# Patient Record
Sex: FEMALE | Race: WHITE | NOT HISPANIC OR LATINO | URBAN - METROPOLITAN AREA
[De-identification: names, ages, dates, MRNs, and addresses within clinical notes are randomized per-mention and may not be internally consistent; named-entity substitution may affect disease eponyms.]

---

## 2024-04-03 ENCOUNTER — EMERGENCY (EMERGENCY)
Facility: HOSPITAL | Age: 49
LOS: 1 days | Discharge: HOME | End: 2024-04-03
Attending: EMERGENCY MEDICINE | Admitting: EMERGENCY MEDICINE
Payer: COMMERCIAL

## 2024-04-03 VITALS
HEART RATE: 90 BPM | SYSTOLIC BLOOD PRESSURE: 129 MMHG | OXYGEN SATURATION: 97 % | RESPIRATION RATE: 18 BRPM | TEMPERATURE: 98 F | DIASTOLIC BLOOD PRESSURE: 85 MMHG

## 2024-04-03 VITALS
SYSTOLIC BLOOD PRESSURE: 113 MMHG | OXYGEN SATURATION: 96 % | HEART RATE: 71 BPM | DIASTOLIC BLOOD PRESSURE: 79 MMHG | RESPIRATION RATE: 17 BRPM | TEMPERATURE: 98 F

## 2024-04-03 DIAGNOSIS — R10.9 UNSPECIFIED ABDOMINAL PAIN: ICD-10-CM

## 2024-04-03 DIAGNOSIS — Z88.2 ALLERGY STATUS TO SULFONAMIDES: ICD-10-CM

## 2024-04-03 DIAGNOSIS — R31.9 HEMATURIA, UNSPECIFIED: ICD-10-CM

## 2024-04-03 DIAGNOSIS — R30.0 DYSURIA: ICD-10-CM

## 2024-04-03 DIAGNOSIS — Z87.442 PERSONAL HISTORY OF URINARY CALCULI: ICD-10-CM

## 2024-04-03 DIAGNOSIS — Z87.19 PERSONAL HISTORY OF OTHER DISEASES OF THE DIGESTIVE SYSTEM: ICD-10-CM

## 2024-04-03 DIAGNOSIS — R82.998 OTHER ABNORMAL FINDINGS IN URINE: ICD-10-CM

## 2024-04-03 DIAGNOSIS — Z88.6 ALLERGY STATUS TO ANALGESIC AGENT: ICD-10-CM

## 2024-04-03 DIAGNOSIS — R19.7 DIARRHEA, UNSPECIFIED: ICD-10-CM

## 2024-04-03 DIAGNOSIS — Z88.0 ALLERGY STATUS TO PENICILLIN: ICD-10-CM

## 2024-04-03 DIAGNOSIS — R11.0 NAUSEA: ICD-10-CM

## 2024-04-03 DIAGNOSIS — N28.89 OTHER SPECIFIED DISORDERS OF KIDNEY AND URETER: ICD-10-CM

## 2024-04-03 LAB
ANION GAP SERPL CALC-SCNC: 12 MMOL/L — SIGNIFICANT CHANGE UP (ref 5–17)
BASOPHILS # BLD AUTO: 0.04 K/UL — SIGNIFICANT CHANGE UP (ref 0–0.2)
BASOPHILS NFR BLD AUTO: 0.9 % — SIGNIFICANT CHANGE UP (ref 0–2)
BUN SERPL-MCNC: 14 MG/DL — SIGNIFICANT CHANGE UP (ref 7–23)
CALCIUM SERPL-MCNC: 10.2 MG/DL — SIGNIFICANT CHANGE UP (ref 8.4–10.5)
CHLORIDE SERPL-SCNC: 105 MMOL/L — SIGNIFICANT CHANGE UP (ref 96–108)
CO2 SERPL-SCNC: 25 MMOL/L — SIGNIFICANT CHANGE UP (ref 22–31)
CREAT SERPL-MCNC: 0.83 MG/DL — SIGNIFICANT CHANGE UP (ref 0.5–1.3)
EGFR: 86 ML/MIN/1.73M2 — SIGNIFICANT CHANGE UP
EOSINOPHIL # BLD AUTO: 0.05 K/UL — SIGNIFICANT CHANGE UP (ref 0–0.5)
EOSINOPHIL NFR BLD AUTO: 1.2 % — SIGNIFICANT CHANGE UP (ref 0–6)
GLUCOSE SERPL-MCNC: 115 MG/DL — HIGH (ref 70–99)
HCT VFR BLD CALC: 35 % — SIGNIFICANT CHANGE UP (ref 34.5–45)
HGB BLD-MCNC: 11.4 G/DL — LOW (ref 11.5–15.5)
IMM GRANULOCYTES NFR BLD AUTO: 0.2 % — SIGNIFICANT CHANGE UP (ref 0–0.9)
LYMPHOCYTES # BLD AUTO: 0.69 K/UL — LOW (ref 1–3.3)
LYMPHOCYTES # BLD AUTO: 16.1 % — SIGNIFICANT CHANGE UP (ref 13–44)
MCHC RBC-ENTMCNC: 28.6 PG — SIGNIFICANT CHANGE UP (ref 27–34)
MCHC RBC-ENTMCNC: 32.6 GM/DL — SIGNIFICANT CHANGE UP (ref 32–36)
MCV RBC AUTO: 87.7 FL — SIGNIFICANT CHANGE UP (ref 80–100)
MONOCYTES # BLD AUTO: 0.34 K/UL — SIGNIFICANT CHANGE UP (ref 0–0.9)
MONOCYTES NFR BLD AUTO: 7.9 % — SIGNIFICANT CHANGE UP (ref 2–14)
NEUTROPHILS # BLD AUTO: 3.15 K/UL — SIGNIFICANT CHANGE UP (ref 1.8–7.4)
NEUTROPHILS NFR BLD AUTO: 73.7 % — SIGNIFICANT CHANGE UP (ref 43–77)
NRBC # BLD: 0 /100 WBCS — SIGNIFICANT CHANGE UP (ref 0–0)
PLATELET # BLD AUTO: 282 K/UL — SIGNIFICANT CHANGE UP (ref 150–400)
POTASSIUM SERPL-MCNC: 4 MMOL/L — SIGNIFICANT CHANGE UP (ref 3.5–5.3)
POTASSIUM SERPL-SCNC: 4 MMOL/L — SIGNIFICANT CHANGE UP (ref 3.5–5.3)
RBC # BLD: 3.99 M/UL — SIGNIFICANT CHANGE UP (ref 3.8–5.2)
RBC # FLD: 14.7 % — HIGH (ref 10.3–14.5)
SODIUM SERPL-SCNC: 142 MMOL/L — SIGNIFICANT CHANGE UP (ref 135–145)
WBC # BLD: 4.28 K/UL — SIGNIFICANT CHANGE UP (ref 3.8–10.5)
WBC # FLD AUTO: 4.28 K/UL — SIGNIFICANT CHANGE UP (ref 3.8–10.5)

## 2024-04-03 PROCEDURE — 99285 EMERGENCY DEPT VISIT HI MDM: CPT

## 2024-04-03 PROCEDURE — 81025 URINE PREGNANCY TEST: CPT

## 2024-04-03 PROCEDURE — 74176 CT ABD & PELVIS W/O CONTRAST: CPT | Mod: MC

## 2024-04-03 PROCEDURE — 80048 BASIC METABOLIC PNL TOTAL CA: CPT

## 2024-04-03 PROCEDURE — 74176 CT ABD & PELVIS W/O CONTRAST: CPT | Mod: 26,MC

## 2024-04-03 PROCEDURE — 81001 URINALYSIS AUTO W/SCOPE: CPT

## 2024-04-03 PROCEDURE — 85025 COMPLETE CBC W/AUTO DIFF WBC: CPT

## 2024-04-03 PROCEDURE — 96375 TX/PRO/DX INJ NEW DRUG ADDON: CPT

## 2024-04-03 PROCEDURE — 99284 EMERGENCY DEPT VISIT MOD MDM: CPT | Mod: 25

## 2024-04-03 PROCEDURE — 96374 THER/PROPH/DIAG INJ IV PUSH: CPT

## 2024-04-03 PROCEDURE — 36415 COLL VENOUS BLD VENIPUNCTURE: CPT

## 2024-04-03 RX ORDER — MORPHINE SULFATE 50 MG/1
4 CAPSULE, EXTENDED RELEASE ORAL ONCE
Refills: 0 | Status: DISCONTINUED | OUTPATIENT
Start: 2024-04-03 | End: 2024-04-03

## 2024-04-03 RX ORDER — ONDANSETRON 8 MG/1
4 TABLET, FILM COATED ORAL ONCE
Refills: 0 | Status: COMPLETED | OUTPATIENT
Start: 2024-04-03 | End: 2024-04-03

## 2024-04-03 RX ORDER — SODIUM CHLORIDE 9 MG/ML
1000 INJECTION INTRAMUSCULAR; INTRAVENOUS; SUBCUTANEOUS ONCE
Refills: 0 | Status: COMPLETED | OUTPATIENT
Start: 2024-04-03 | End: 2024-04-03

## 2024-04-03 RX ADMIN — MORPHINE SULFATE 4 MILLIGRAM(S): 50 CAPSULE, EXTENDED RELEASE ORAL at 14:37

## 2024-04-03 RX ADMIN — SODIUM CHLORIDE 1000 MILLILITER(S): 9 INJECTION INTRAMUSCULAR; INTRAVENOUS; SUBCUTANEOUS at 14:36

## 2024-04-03 RX ADMIN — MORPHINE SULFATE 4 MILLIGRAM(S): 50 CAPSULE, EXTENDED RELEASE ORAL at 16:06

## 2024-04-03 RX ADMIN — ONDANSETRON 4 MILLIGRAM(S): 8 TABLET, FILM COATED ORAL at 14:37

## 2024-04-03 NOTE — ED PROVIDER NOTE - NSFOLLOWUPINSTRUCTIONS_ED_ALL_ED_FT
Please rest and remain well hydrated with plenty of fluids.  You can take tylenol 650mg every 6 hours pain/bodyaches    Please call to make appointment in urology clinic within one week located at [address] [place] [address] [address]  [phone]    Flank Pain, Adult  Flank pain is pain that is located on the side of the body between the upper abdomen and the spine. This area is called the flank. The pain may occur over a short period of time (acute), or it may be long-term or recurring (chronic). It may be mild or severe. Flank pain can be caused by many things, including:  Muscle soreness or injury.  Kidney infection, kidney stones, or kidney disease.  Stress.  A disease of the spine (vertebral disk disease).  A lung infection (pneumonia).  Fluid around the lungs (pulmonary edema).  A skin rash caused by the chickenpox virus (shingles).  Tumors that affect the back of the abdomen.  Gallbladder disease.  Follow these instructions at home:  A comparison of three sample cups showing dark yellow, yellow, and pale yellow urine.  Drink enough fluid to keep your urine pale yellow.  Rest as told by your health care provider.  Take over-the-counter and prescription medicines only as told by your health care provider.  Keep a journal to track what has caused your flank pain and what has made it feel better.  Keep all follow-up visits. This is important.  Contact a health care provider if:  Your pain is not controlled with medicine.  You have new symptoms.  Your pain gets worse.  Your symptoms last longer than 2–3 days.  You have trouble urinating or you are urinating very frequently.  Get help right away if:  You have trouble breathing or you are short of breath.  Your abdomen hurts or it is swollen or red.  You have nausea or vomiting.  You feel faint, or you faint.  You have blood in your urine.  You have flank pain and a fever.  These symptoms may represent a serious problem that is an emergency. Do not wait to see if the symptoms will go away. Get medical help right away. Call your local emergency services (911 in the [place] Do not drive yourself to the hospital.    Summary  Flank pain is pain that is located on the side of the body between the upper abdomen and the spine.  The pain may occur over a short period of time (acute), or it may be long-term or recurring (chronic). It may be mild or severe.  Flank pain can be caused by many things.  Contact your health care provider if your symptoms get worse or last longer than 2–3 days.  This information is not intended to replace advice given to you by your health care provider. Make sure you discuss any questions you have with your health care provider.

## 2024-04-03 NOTE — ED ADULT NURSE REASSESSMENT NOTE - NS ED NURSE REASSESS COMMENT FT1
All labs, CT scan resulted, flank pain improved, no new symptom complaint. VItal signs stable. [facility]

## 2024-04-03 NOTE — ED PROVIDER NOTE - OBJECTIVE STATEMENT
49 F pmh renal stones, L atrophic kidney, diverticulitis p/w L flank pain x 2 days.  pt reports L flank pain radiating to LLQ, intermittent colicky sharp pain. also reports hematuria, mild dysuria and nausea, no vomiting.  tried tylenol w/o relief.  feels similar to renal stones in past.  also w/ few episodes of loose nb stools.  denies f/c, HA, dizziness, chest pain, sob, uri sxs, urinary frequency/urgency, trauma

## 2024-04-03 NOTE — ED PROVIDER NOTE - CLINICAL SUMMARY MEDICAL DECISION MAKING FREE TEXT BOX
49 F pmh renal stones, L atrophic kidney, diverticulitis p/w L flank pain x 2 days.  also reports hematuria and nausea.  on exam vss, afebrile, comfortable appearing, abd soft/nt, no r/g, + L cvat.  r/o renal stone vs uti/pyelo vs less likely diverticulitis or msk pain; no rash to indicate zoster.  will obtain labs, urine, ct a/p and give ivf/analgesia    labs wnl, UA + blood, no infection.  ct a/p shows Nonobstructive calcifications in the left kidney which demonstrates area of cortical scarring and thinning. No hydronephrosis in either side. No obstructive ureteral calculi.   possible passed renal stone vs msk pain.  recommend PO hydration, tylenol and f/u with pmd/uro.  discussed strict return parameters  pt reports being seen in [facility] under another name but unable to find 2nd chart.

## 2024-04-03 NOTE — ED PROVIDER NOTE - CARE PROVIDERS DIRECT ADDRESSES
[email or web address]

## 2024-04-03 NOTE — ED ADULT NURSE NOTE - OBJECTIVE STATEMENT
Left flank pain w/ hematuria and nausea, no vomitting, no dysuria, no fever/chills, states has Hx of kidney stones.

## 2024-04-03 NOTE — ED PROVIDER NOTE - ATTENDING APP SHARED VISIT CONTRIBUTION OF CARE
Vitals wnl, exam as above. Benign abdomen.  Given morphine, zofran, IVF.   Hgb 11.4, other labs grossly wnl.   UA w/ large blood, trace leuk esterase, 28 RBCs, unlikely UTI.   CT a/p showed "Nonobstructive calcifications in the left kidney which demonstrates area of cortical scarring and thinning. No hydronephrosis in either side. No obstructive ureteral calculi."  Remains well appearing. Still in pain but improved. Abd remains benign.   Discussed importance of outpt follow up and return precautions. Clinically no indication for further emergent ED workup or hospitalization at this time. Stable for dc, outpt f/u.

## 2024-04-03 NOTE — ED PROVIDER NOTE - CARE PROVIDER_API CALL
Yang Nguyen  Urology  [address]  [place] [address]  Phone: [phone]  Fax: [phone]  Follow Up Time:     Rigo Allan.  Urology  [address]  [place] [address]  Phone: [phone]  Fax: [phone]  Follow Up Time:

## 2024-04-03 NOTE — ED ADULT TRIAGE NOTE - CHIEF COMPLAINT QUOTE
+ L flank, abd pain x 1 day with hematuria and nausea- similar to previous presentations kidney stones, last stone 6 mos ago

## 2024-04-03 NOTE — ED ADULT NURSE NOTE - NSFALLRISKASMT_ED_ALL_ED_DT
03-Apr-2024 14:26 ACLS Rescue Kit/IV pump/Cardiac Monitor/Defib/ACLS/Rescue Kit/O2/BVM/ventilator Cigarettes

## 2024-04-03 NOTE — ED PROVIDER NOTE - PHYSICAL EXAMINATION
Vitals reviewed  Gen: comfortable appearing, nad, speaking in full sentences  Skin: wwp, no rash/lesions  HEENT: ncat, eomi, mmm, airway patent   CV: rrr, no audible m/r/g  Resp: symmetrical expansion, ctab, no w/r/r  Abd: nondistended, soft, nontender, no r/g, + L cvat   Ext: FROM throughout, no peripheral edema, no muscle or joint ttp, distal pulses 2+  Neuro: alert/oriented, no focal deficits, steady gait

## 2024-04-03 NOTE — ED ADULT NURSE REASSESSMENT NOTE - NS ED NURSE REASSESS COMMENT FT1
Patient /aoX 3, c/o of left flank pain w/ hematuria, no dysuria, with nausea, no vomitting.  Vital signs stble.  Left hand PIV #20 in place, all labs sent,n o complications.  NSS 1 L bolus ongoing, administered Morphine 4mg IVP, Zofran 4mg IVP. CT scan done; results pending.  NPO observed. spouse smokes

## 2024-04-03 NOTE — ED PROVIDER NOTE - PATIENT PORTAL LINK FT
You can access the FollowMyHealth Patient Portal offered by [facility] by registering at the following website: [email or web address]. By joining [employer]’s FollowMyHealth portal, you will also be able to view your health information using other applications (apps) compatible with our system.

## 2024-04-05 ENCOUNTER — EMERGENCY (EMERGENCY)
Facility: HOSPITAL | Age: 49
LOS: 1 days | Discharge: HOME | End: 2024-04-05
Attending: STUDENT IN AN ORGANIZED HEALTH CARE EDUCATION/TRAINING PROGRAM
Payer: MEDICAID

## 2024-04-05 VITALS
HEART RATE: 79 BPM | DIASTOLIC BLOOD PRESSURE: 91 MMHG | OXYGEN SATURATION: 99 % | TEMPERATURE: 98 F | SYSTOLIC BLOOD PRESSURE: 142 MMHG | HEIGHT: 67 IN | RESPIRATION RATE: 18 BRPM | WEIGHT: 195.99 LBS

## 2024-04-05 PROCEDURE — 99053 MED SERV 10PM-8AM 24 HR FAC: CPT

## 2024-04-05 PROCEDURE — 99285 EMERGENCY DEPT VISIT HI MDM: CPT

## 2024-04-06 VITALS
SYSTOLIC BLOOD PRESSURE: 130 MMHG | TEMPERATURE: 97 F | HEART RATE: 65 BPM | DIASTOLIC BLOOD PRESSURE: 87 MMHG | OXYGEN SATURATION: 99 % | RESPIRATION RATE: 18 BRPM

## 2024-04-06 LAB
ALBUMIN SERPL ELPH-MCNC: 3.8 G/DL — SIGNIFICANT CHANGE UP (ref 3.5–5)
ALP SERPL-CCNC: 64 U/L — SIGNIFICANT CHANGE UP (ref 40–120)
ALT FLD-CCNC: 22 U/L DA — SIGNIFICANT CHANGE UP (ref 10–60)
ANION GAP SERPL CALC-SCNC: 7 MMOL/L — SIGNIFICANT CHANGE UP (ref 5–17)
APPEARANCE UR: CLEAR — SIGNIFICANT CHANGE UP
AST SERPL-CCNC: 12 U/L — SIGNIFICANT CHANGE UP (ref 10–40)
BACTERIA # UR AUTO: ABNORMAL /HPF
BASOPHILS # BLD AUTO: 0.03 K/UL — SIGNIFICANT CHANGE UP (ref 0–0.2)
BASOPHILS NFR BLD AUTO: 0.8 % — SIGNIFICANT CHANGE UP (ref 0–2)
BILIRUB SERPL-MCNC: 0.2 MG/DL — SIGNIFICANT CHANGE UP (ref 0.2–1.2)
BILIRUB UR-MCNC: NEGATIVE — SIGNIFICANT CHANGE UP
BUN SERPL-MCNC: 11 MG/DL — SIGNIFICANT CHANGE UP (ref 7–18)
CALCIUM SERPL-MCNC: 9.4 MG/DL — SIGNIFICANT CHANGE UP (ref 8.4–10.5)
CHLORIDE SERPL-SCNC: 106 MMOL/L — SIGNIFICANT CHANGE UP (ref 96–108)
CO2 SERPL-SCNC: 26 MMOL/L — SIGNIFICANT CHANGE UP (ref 22–31)
COLOR SPEC: YELLOW — SIGNIFICANT CHANGE UP
CREAT SERPL-MCNC: 0.82 MG/DL — SIGNIFICANT CHANGE UP (ref 0.5–1.3)
DIFF PNL FLD: ABNORMAL
EGFR: 87 ML/MIN/1.73M2 — SIGNIFICANT CHANGE UP
EOSINOPHIL # BLD AUTO: 0.14 K/UL — SIGNIFICANT CHANGE UP (ref 0–0.5)
EOSINOPHIL NFR BLD AUTO: 3.8 % — SIGNIFICANT CHANGE UP (ref 0–6)
EPI CELLS # UR: PRESENT
GLUCOSE SERPL-MCNC: 95 MG/DL — SIGNIFICANT CHANGE UP (ref 70–99)
GLUCOSE UR QL: NEGATIVE MG/DL — SIGNIFICANT CHANGE UP
HCG SERPL-ACNC: 2 MIU/ML — SIGNIFICANT CHANGE UP
HCT VFR BLD CALC: 35 % — SIGNIFICANT CHANGE UP (ref 34.5–45)
HGB BLD-MCNC: 11.4 G/DL — LOW (ref 11.5–15.5)
IMM GRANULOCYTES NFR BLD AUTO: 0.3 % — SIGNIFICANT CHANGE UP (ref 0–0.9)
KETONES UR-MCNC: NEGATIVE MG/DL — SIGNIFICANT CHANGE UP
LACTATE SERPL-SCNC: 1.7 MMOL/L — SIGNIFICANT CHANGE UP (ref 0.7–2)
LEUKOCYTE ESTERASE UR-ACNC: ABNORMAL
LIDOCAIN IGE QN: 33 U/L — SIGNIFICANT CHANGE UP (ref 13–75)
LYMPHOCYTES # BLD AUTO: 0.81 K/UL — LOW (ref 1–3.3)
LYMPHOCYTES # BLD AUTO: 21.8 % — SIGNIFICANT CHANGE UP (ref 13–44)
MCHC RBC-ENTMCNC: 28.4 PG — SIGNIFICANT CHANGE UP (ref 27–34)
MCHC RBC-ENTMCNC: 32.6 GM/DL — SIGNIFICANT CHANGE UP (ref 32–36)
MCV RBC AUTO: 87.1 FL — SIGNIFICANT CHANGE UP (ref 80–100)
MONOCYTES # BLD AUTO: 0.46 K/UL — SIGNIFICANT CHANGE UP (ref 0–0.9)
MONOCYTES NFR BLD AUTO: 12.4 % — SIGNIFICANT CHANGE UP (ref 2–14)
NEUTROPHILS # BLD AUTO: 2.27 K/UL — SIGNIFICANT CHANGE UP (ref 1.8–7.4)
NEUTROPHILS NFR BLD AUTO: 60.9 % — SIGNIFICANT CHANGE UP (ref 43–77)
NITRITE UR-MCNC: NEGATIVE — SIGNIFICANT CHANGE UP
NRBC # BLD: 0 /100 WBCS — SIGNIFICANT CHANGE UP (ref 0–0)
PH UR: 7 — SIGNIFICANT CHANGE UP (ref 5–8)
PLATELET # BLD AUTO: 286 K/UL — SIGNIFICANT CHANGE UP (ref 150–400)
POTASSIUM SERPL-MCNC: 3.9 MMOL/L — SIGNIFICANT CHANGE UP (ref 3.5–5.3)
POTASSIUM SERPL-SCNC: 3.9 MMOL/L — SIGNIFICANT CHANGE UP (ref 3.5–5.3)
PROT SERPL-MCNC: 7 G/DL — SIGNIFICANT CHANGE UP (ref 6–8.3)
PROT UR-MCNC: 30 MG/DL
RBC # BLD: 4.02 M/UL — SIGNIFICANT CHANGE UP (ref 3.8–5.2)
RBC # FLD: 14.6 % — HIGH (ref 10.3–14.5)
RBC CASTS # UR COMP ASSIST: 10 /HPF — HIGH (ref 0–4)
SODIUM SERPL-SCNC: 139 MMOL/L — SIGNIFICANT CHANGE UP (ref 135–145)
SP GR SPEC: 1.01 — SIGNIFICANT CHANGE UP (ref 1–1.03)
UROBILINOGEN FLD QL: 0.2 MG/DL — SIGNIFICANT CHANGE UP (ref 0.2–1)
WBC # BLD: 3.72 K/UL — LOW (ref 3.8–10.5)
WBC # FLD AUTO: 3.72 K/UL — LOW (ref 3.8–10.5)
WBC UR QL: 3 /HPF — SIGNIFICANT CHANGE UP (ref 0–5)

## 2024-04-06 PROCEDURE — 36415 COLL VENOUS BLD VENIPUNCTURE: CPT

## 2024-04-06 PROCEDURE — 99284 EMERGENCY DEPT VISIT MOD MDM: CPT | Mod: 25

## 2024-04-06 PROCEDURE — 96376 TX/PRO/DX INJ SAME DRUG ADON: CPT

## 2024-04-06 PROCEDURE — 83690 ASSAY OF LIPASE: CPT

## 2024-04-06 PROCEDURE — 96375 TX/PRO/DX INJ NEW DRUG ADDON: CPT

## 2024-04-06 PROCEDURE — 74176 CT ABD & PELVIS W/O CONTRAST: CPT | Mod: 26,MC

## 2024-04-06 PROCEDURE — 83605 ASSAY OF LACTIC ACID: CPT

## 2024-04-06 PROCEDURE — 85025 COMPLETE CBC W/AUTO DIFF WBC: CPT

## 2024-04-06 PROCEDURE — 96374 THER/PROPH/DIAG INJ IV PUSH: CPT

## 2024-04-06 PROCEDURE — 81001 URINALYSIS AUTO W/SCOPE: CPT

## 2024-04-06 PROCEDURE — 74176 CT ABD & PELVIS W/O CONTRAST: CPT | Mod: MC

## 2024-04-06 PROCEDURE — 84702 CHORIONIC GONADOTROPIN TEST: CPT

## 2024-04-06 PROCEDURE — 80053 COMPREHEN METABOLIC PANEL: CPT

## 2024-04-06 RX ORDER — MORPHINE SULFATE 50 MG/1
4 CAPSULE, EXTENDED RELEASE ORAL ONCE
Refills: 0 | Status: DISCONTINUED | OUTPATIENT
Start: 2024-04-06 | End: 2024-04-06

## 2024-04-06 RX ORDER — ONDANSETRON 8 MG/1
4 TABLET, FILM COATED ORAL ONCE
Refills: 0 | Status: COMPLETED | OUTPATIENT
Start: 2024-04-06 | End: 2024-04-06

## 2024-04-06 RX ORDER — ACETAMINOPHEN 500 MG
975 TABLET ORAL ONCE
Refills: 0 | Status: COMPLETED | OUTPATIENT
Start: 2024-04-06 | End: 2024-04-06

## 2024-04-06 RX ORDER — HYDROMORPHONE HYDROCHLORIDE 2 MG/ML
1 INJECTION INTRAMUSCULAR; INTRAVENOUS; SUBCUTANEOUS ONCE
Refills: 0 | Status: DISCONTINUED | OUTPATIENT
Start: 2024-04-06 | End: 2024-04-06

## 2024-04-06 RX ORDER — TRAMADOL HYDROCHLORIDE 50 MG/1
1 TABLET ORAL
Qty: 4 | Refills: 0
Start: 2024-04-06 | End: 2024-04-06

## 2024-04-06 RX ORDER — SODIUM CHLORIDE 9 MG/ML
1000 INJECTION INTRAMUSCULAR; INTRAVENOUS; SUBCUTANEOUS ONCE
Refills: 0 | Status: COMPLETED | OUTPATIENT
Start: 2024-04-06 | End: 2024-04-06

## 2024-04-06 RX ADMIN — ONDANSETRON 4 MILLIGRAM(S): 8 TABLET, FILM COATED ORAL at 02:07

## 2024-04-06 RX ADMIN — Medication 975 MILLIGRAM(S): at 02:07

## 2024-04-06 RX ADMIN — HYDROMORPHONE HYDROCHLORIDE 1 MILLIGRAM(S): 2 INJECTION INTRAMUSCULAR; INTRAVENOUS; SUBCUTANEOUS at 02:30

## 2024-04-06 RX ADMIN — HYDROMORPHONE HYDROCHLORIDE 1 MILLIGRAM(S): 2 INJECTION INTRAMUSCULAR; INTRAVENOUS; SUBCUTANEOUS at 04:48

## 2024-04-06 RX ADMIN — Medication 975 MILLIGRAM(S): at 02:30

## 2024-04-06 RX ADMIN — HYDROMORPHONE HYDROCHLORIDE 1 MILLIGRAM(S): 2 INJECTION INTRAMUSCULAR; INTRAVENOUS; SUBCUTANEOUS at 02:07

## 2024-04-06 RX ADMIN — SODIUM CHLORIDE 1000 MILLILITER(S): 9 INJECTION INTRAMUSCULAR; INTRAVENOUS; SUBCUTANEOUS at 02:07

## 2024-04-06 RX ADMIN — HYDROMORPHONE HYDROCHLORIDE 1 MILLIGRAM(S): 2 INJECTION INTRAMUSCULAR; INTRAVENOUS; SUBCUTANEOUS at 04:54

## 2024-04-06 NOTE — ED PROVIDER NOTE - CLINICAL SUMMARY MEDICAL DECISION MAKING FREE TEXT BOX
49-year-old female hx of prior kidney stone, appendectomy, cholecystectomy, hysterectomy c/b provoked PE,  presenting with nausea/vomiting/diarrhea as well as dysuria and LLQ pain radiating to L flank. Likely pyelonephritis vs kidney stone. Will check labs, urine, CTAP without contrast, provide pain meds and IVF, and reassess.

## 2024-04-06 NOTE — ED PROVIDER NOTE - NSFOLLOWUPINSTRUCTIONS_ED_ALL_ED_FT
You were seen in the emergency department for: nausea/vomiting/diarrhea  Your results report is attached.   For your pain - please take Tylenol 1000mg every 6 hours as needed or Ibuprofen 600mg every 6 hours as needed - you can alternate every 3 hours as needed.   From this ED visit you were prescribed: Tramadol as needed only for severe pain  We recommend you follow up with: your primary care doctor, Urology    Please return to the Emergency Department if you experience any of the following symptoms:   - Shortness of breath or trouble breathing  - Pressure, pain or tightness in the chest  - Face drooping, arm weakness or speech difficulty  - Persistence of severe vomiting  - Head injury or loss of consciousness  - Nonstop bleeding or an open wound    (1) Follow up with your primary care physician within the next 24-48 hours as discussed. In addition, we did not find evidence of a life threatening illness on your testing here today, but listed below are the specialists that will be necessary to see as an outpatient to continue the workup.  Please call the numbers listed below or [phone] to set up the necessary appointments.  (2) Take Tylenol (up to 1000mg or 1 g)  and/or Motrin (up to 600mg) up to every 6 hours as needed for pain.   (3) If you had an IV (intravenous) line placed, it was removed. Sometimes, after IV removal, that area can be tender for a few days; if it develops redness and swelling, those could be signs of infection; in which case, return to the Emergency Department for assessment.  (4) Please continue taking all of your home medications as directed.

## 2024-04-06 NOTE — ED PROVIDER NOTE - PATIENT PORTAL LINK FT
You can access the FollowMyHealth Patient Portal offered by [facility] by registering at the following website: [email or web address]. By joining [employer]’s FollowMyHealth portal, you will also be able to view your health information using other applications (apps) compatible with our system.

## 2024-04-06 NOTE — ED PROVIDER NOTE - PROGRESS NOTE DETAILS
Labs normal.  Urine + for blood.  CTAP:   Subcentimeter right renal cortical hyperdensity possibly a small   hemorrhagic cyst. Left renal cortical scarring. A few punctate   nonobstructing calcifications. Subcentimeter cortical hyperdensity   possibly a small hemorrhagic cyst. No hydronephrosis or obstructing   urinary tract calculi.    Possibly passed kidney stone.   Patient feels ok. Will d/c, return precautions provided.

## 2024-04-06 NOTE — ED PROVIDER NOTE - OBJECTIVE STATEMENT
49-year-old female hx of prior kidney stone, appendectomy, cholecystectomy, hysterectomy c/b provoked PE,  presenting with nausea/vomiting/diarrhea as well as dysuria and LLQ pain radiating to L flank. She is not sure if this feels like a prior stone. Denies any other symptoms.

## 2024-04-10 ENCOUNTER — INPATIENT (INPATIENT)
Facility: HOSPITAL | Age: 49
LOS: 0 days | Discharge: HOME | DRG: 696 | End: 2024-04-11
Attending: INTERNAL MEDICINE | Admitting: INTERNAL MEDICINE
Payer: MEDICAID

## 2024-04-10 VITALS
OXYGEN SATURATION: 96 % | HEART RATE: 80 BPM | RESPIRATION RATE: 18 BRPM | WEIGHT: 195.11 LBS | TEMPERATURE: 98 F | HEIGHT: 67 IN | DIASTOLIC BLOOD PRESSURE: 88 MMHG | SYSTOLIC BLOOD PRESSURE: 137 MMHG

## 2024-04-10 DIAGNOSIS — Z29.9 ENCOUNTER FOR PROPHYLACTIC MEASURES, UNSPECIFIED: ICD-10-CM

## 2024-04-10 DIAGNOSIS — Z90.49 ACQUIRED ABSENCE OF OTHER SPECIFIED PARTS OF DIGESTIVE TRACT: Chronic | ICD-10-CM

## 2024-04-10 DIAGNOSIS — G43.909 MIGRAINE, UNSPECIFIED, NOT INTRACTABLE, WITHOUT STATUS MIGRAINOSUS: ICD-10-CM

## 2024-04-10 DIAGNOSIS — R10.9 UNSPECIFIED ABDOMINAL PAIN: ICD-10-CM

## 2024-04-10 DIAGNOSIS — R31.9 HEMATURIA, UNSPECIFIED: ICD-10-CM

## 2024-04-10 DIAGNOSIS — Z90.710 ACQUIRED ABSENCE OF BOTH CERVIX AND UTERUS: Chronic | ICD-10-CM

## 2024-04-10 DIAGNOSIS — E87.6 HYPOKALEMIA: ICD-10-CM

## 2024-04-10 DIAGNOSIS — N12 TUBULO-INTERSTITIAL NEPHRITIS, NOT SPECIFIED AS ACUTE OR CHRONIC: ICD-10-CM

## 2024-04-10 LAB
ALBUMIN SERPL ELPH-MCNC: 3.9 G/DL — SIGNIFICANT CHANGE UP (ref 3.5–5)
ALP SERPL-CCNC: 67 U/L — SIGNIFICANT CHANGE UP (ref 40–120)
ALT FLD-CCNC: 26 U/L DA — SIGNIFICANT CHANGE UP (ref 10–60)
ANION GAP SERPL CALC-SCNC: 7 MMOL/L — SIGNIFICANT CHANGE UP (ref 5–17)
APPEARANCE UR: CLEAR — SIGNIFICANT CHANGE UP
AST SERPL-CCNC: 11 U/L — SIGNIFICANT CHANGE UP (ref 10–40)
BASOPHILS # BLD AUTO: 0.03 K/UL — SIGNIFICANT CHANGE UP (ref 0–0.2)
BASOPHILS NFR BLD AUTO: 0.6 % — SIGNIFICANT CHANGE UP (ref 0–2)
BILIRUB SERPL-MCNC: 0.2 MG/DL — SIGNIFICANT CHANGE UP (ref 0.2–1.2)
BILIRUB UR-MCNC: NEGATIVE — SIGNIFICANT CHANGE UP
BUN SERPL-MCNC: 19 MG/DL — HIGH (ref 7–18)
CALCIUM SERPL-MCNC: 9.6 MG/DL — SIGNIFICANT CHANGE UP (ref 8.4–10.5)
CHLORIDE SERPL-SCNC: 108 MMOL/L — SIGNIFICANT CHANGE UP (ref 96–108)
CO2 SERPL-SCNC: 27 MMOL/L — SIGNIFICANT CHANGE UP (ref 22–31)
COLOR SPEC: YELLOW — SIGNIFICANT CHANGE UP
CREAT SERPL-MCNC: 0.85 MG/DL — SIGNIFICANT CHANGE UP (ref 0.5–1.3)
DIFF PNL FLD: ABNORMAL
EGFR: 83 ML/MIN/1.73M2 — SIGNIFICANT CHANGE UP
EOSINOPHIL # BLD AUTO: 0.12 K/UL — SIGNIFICANT CHANGE UP (ref 0–0.5)
EOSINOPHIL NFR BLD AUTO: 2.6 % — SIGNIFICANT CHANGE UP (ref 0–6)
GLUCOSE SERPL-MCNC: 105 MG/DL — HIGH (ref 70–99)
GLUCOSE UR QL: NEGATIVE MG/DL — SIGNIFICANT CHANGE UP
HCT VFR BLD CALC: 35.3 % — SIGNIFICANT CHANGE UP (ref 34.5–45)
HGB BLD-MCNC: 11.2 G/DL — LOW (ref 11.5–15.5)
IMM GRANULOCYTES NFR BLD AUTO: 0.2 % — SIGNIFICANT CHANGE UP (ref 0–0.9)
KETONES UR-MCNC: NEGATIVE MG/DL — SIGNIFICANT CHANGE UP
LACTATE SERPL-SCNC: 1.5 MMOL/L — SIGNIFICANT CHANGE UP (ref 0.7–2)
LEUKOCYTE ESTERASE UR-ACNC: ABNORMAL
LIDOCAIN IGE QN: 35 U/L — SIGNIFICANT CHANGE UP (ref 13–75)
LYMPHOCYTES # BLD AUTO: 0.83 K/UL — LOW (ref 1–3.3)
LYMPHOCYTES # BLD AUTO: 17.9 % — SIGNIFICANT CHANGE UP (ref 13–44)
MCHC RBC-ENTMCNC: 28.2 PG — SIGNIFICANT CHANGE UP (ref 27–34)
MCHC RBC-ENTMCNC: 31.7 GM/DL — LOW (ref 32–36)
MCV RBC AUTO: 88.9 FL — SIGNIFICANT CHANGE UP (ref 80–100)
MONOCYTES # BLD AUTO: 0.42 K/UL — SIGNIFICANT CHANGE UP (ref 0–0.9)
MONOCYTES NFR BLD AUTO: 9.1 % — SIGNIFICANT CHANGE UP (ref 2–14)
NEUTROPHILS # BLD AUTO: 3.23 K/UL — SIGNIFICANT CHANGE UP (ref 1.8–7.4)
NEUTROPHILS NFR BLD AUTO: 69.6 % — SIGNIFICANT CHANGE UP (ref 43–77)
NITRITE UR-MCNC: NEGATIVE — SIGNIFICANT CHANGE UP
NRBC # BLD: 0 /100 WBCS — SIGNIFICANT CHANGE UP (ref 0–0)
PH UR: 6 — SIGNIFICANT CHANGE UP (ref 5–8)
PLATELET # BLD AUTO: 319 K/UL — SIGNIFICANT CHANGE UP (ref 150–400)
POTASSIUM SERPL-MCNC: 3.4 MMOL/L — LOW (ref 3.5–5.3)
POTASSIUM SERPL-SCNC: 3.4 MMOL/L — LOW (ref 3.5–5.3)
PROT SERPL-MCNC: 7.1 G/DL — SIGNIFICANT CHANGE UP (ref 6–8.3)
PROT UR-MCNC: 30 MG/DL
RBC # BLD: 3.97 M/UL — SIGNIFICANT CHANGE UP (ref 3.8–5.2)
RBC # FLD: 14.5 % — SIGNIFICANT CHANGE UP (ref 10.3–14.5)
SODIUM SERPL-SCNC: 142 MMOL/L — SIGNIFICANT CHANGE UP (ref 135–145)
SP GR SPEC: 1.01 — SIGNIFICANT CHANGE UP (ref 1–1.03)
TROPONIN I, HIGH SENSITIVITY RESULT: <3 NG/L — SIGNIFICANT CHANGE UP
UROBILINOGEN FLD QL: 0.2 MG/DL — SIGNIFICANT CHANGE UP (ref 0.2–1)
WBC # BLD: 4.64 K/UL — SIGNIFICANT CHANGE UP (ref 3.8–10.5)
WBC # FLD AUTO: 4.64 K/UL — SIGNIFICANT CHANGE UP (ref 3.8–10.5)

## 2024-04-10 PROCEDURE — 76770 US EXAM ABDO BACK WALL COMP: CPT | Mod: 26

## 2024-04-10 PROCEDURE — 99285 EMERGENCY DEPT VISIT HI MDM: CPT | Mod: 25

## 2024-04-10 PROCEDURE — 71045 X-RAY EXAM CHEST 1 VIEW: CPT | Mod: 26

## 2024-04-10 PROCEDURE — 99222 1ST HOSP IP/OBS MODERATE 55: CPT

## 2024-04-10 PROCEDURE — 76856 US EXAM PELVIC COMPLETE: CPT | Mod: 26,59

## 2024-04-10 PROCEDURE — 99223 1ST HOSP IP/OBS HIGH 75: CPT | Mod: GC

## 2024-04-10 RX ORDER — CEFTRIAXONE 500 MG/1
2000 INJECTION, POWDER, FOR SOLUTION INTRAMUSCULAR; INTRAVENOUS EVERY 24 HOURS
Refills: 0 | Status: DISCONTINUED | OUTPATIENT
Start: 2024-04-10 | End: 2024-04-10

## 2024-04-10 RX ORDER — CEFTRIAXONE 500 MG/1
1000 INJECTION, POWDER, FOR SOLUTION INTRAMUSCULAR; INTRAVENOUS EVERY 24 HOURS
Refills: 0 | Status: DISCONTINUED | OUTPATIENT
Start: 2024-04-11 | End: 2024-04-11

## 2024-04-10 RX ORDER — OXYCODONE HYDROCHLORIDE 5 MG/1
5 TABLET ORAL EVERY 4 HOURS
Refills: 0 | Status: DISCONTINUED | OUTPATIENT
Start: 2024-04-10 | End: 2024-04-10

## 2024-04-10 RX ORDER — MORPHINE SULFATE 50 MG/1
4 CAPSULE, EXTENDED RELEASE ORAL EVERY 4 HOURS
Refills: 0 | Status: DISCONTINUED | OUTPATIENT
Start: 2024-04-10 | End: 2024-04-11

## 2024-04-10 RX ORDER — ONDANSETRON 8 MG/1
4 TABLET, FILM COATED ORAL ONCE
Refills: 0 | Status: COMPLETED | OUTPATIENT
Start: 2024-04-10 | End: 2024-04-10

## 2024-04-10 RX ORDER — NALOXONE HYDROCHLORIDE 4 MG/.1ML
0.4 SPRAY NASAL ONCE
Refills: 0 | Status: DISCONTINUED | OUTPATIENT
Start: 2024-04-10 | End: 2024-04-11

## 2024-04-10 RX ORDER — SENNA PLUS 8.6 MG/1
2 TABLET ORAL AT BEDTIME
Refills: 0 | Status: DISCONTINUED | OUTPATIENT
Start: 2024-04-10 | End: 2024-04-11

## 2024-04-10 RX ORDER — POTASSIUM CHLORIDE 20 MEQ
40 PACKET (EA) ORAL ONCE
Refills: 0 | Status: COMPLETED | OUTPATIENT
Start: 2024-04-10 | End: 2024-04-10

## 2024-04-10 RX ORDER — MORPHINE SULFATE 50 MG/1
4 CAPSULE, EXTENDED RELEASE ORAL ONCE
Refills: 0 | Status: DISCONTINUED | OUTPATIENT
Start: 2024-04-10 | End: 2024-04-10

## 2024-04-10 RX ORDER — SODIUM CHLORIDE 9 MG/ML
1000 INJECTION INTRAMUSCULAR; INTRAVENOUS; SUBCUTANEOUS ONCE
Refills: 0 | Status: COMPLETED | OUTPATIENT
Start: 2024-04-10 | End: 2024-04-10

## 2024-04-10 RX ORDER — CEFTRIAXONE 500 MG/1
1000 INJECTION, POWDER, FOR SOLUTION INTRAMUSCULAR; INTRAVENOUS ONCE
Refills: 0 | Status: COMPLETED | OUTPATIENT
Start: 2024-04-10 | End: 2024-04-10

## 2024-04-10 RX ORDER — ACETAMINOPHEN 500 MG
1000 TABLET ORAL ONCE
Refills: 0 | Status: COMPLETED | OUTPATIENT
Start: 2024-04-10 | End: 2024-04-10

## 2024-04-10 RX ORDER — LIDOCAINE 4 G/100G
1 CREAM TOPICAL DAILY
Refills: 0 | Status: DISCONTINUED | OUTPATIENT
Start: 2024-04-10 | End: 2024-04-11

## 2024-04-10 RX ORDER — MORPHINE SULFATE 50 MG/1
2 CAPSULE, EXTENDED RELEASE ORAL EVERY 4 HOURS
Refills: 0 | Status: DISCONTINUED | OUTPATIENT
Start: 2024-04-10 | End: 2024-04-11

## 2024-04-10 RX ORDER — POLYETHYLENE GLYCOL 3350 17 G/17G
17 POWDER, FOR SOLUTION ORAL DAILY
Refills: 0 | Status: DISCONTINUED | OUTPATIENT
Start: 2024-04-10 | End: 2024-04-11

## 2024-04-10 RX ORDER — ACETAMINOPHEN 500 MG
1000 TABLET ORAL EVERY 8 HOURS
Refills: 0 | Status: DISCONTINUED | OUTPATIENT
Start: 2024-04-10 | End: 2024-04-11

## 2024-04-10 RX ORDER — OXYCODONE HYDROCHLORIDE 5 MG/1
2.5 TABLET ORAL EVERY 4 HOURS
Refills: 0 | Status: DISCONTINUED | OUTPATIENT
Start: 2024-04-10 | End: 2024-04-10

## 2024-04-10 RX ADMIN — MORPHINE SULFATE 2 MILLIGRAM(S): 50 CAPSULE, EXTENDED RELEASE ORAL at 21:30

## 2024-04-10 RX ADMIN — MORPHINE SULFATE 4 MILLIGRAM(S): 50 CAPSULE, EXTENDED RELEASE ORAL at 04:49

## 2024-04-10 RX ADMIN — MORPHINE SULFATE 2 MILLIGRAM(S): 50 CAPSULE, EXTENDED RELEASE ORAL at 22:15

## 2024-04-10 RX ADMIN — OXYCODONE HYDROCHLORIDE 5 MILLIGRAM(S): 5 TABLET ORAL at 11:37

## 2024-04-10 RX ADMIN — Medication 1000 MILLIGRAM(S): at 13:09

## 2024-04-10 RX ADMIN — ONDANSETRON 4 MILLIGRAM(S): 8 TABLET, FILM COATED ORAL at 02:39

## 2024-04-10 RX ADMIN — CEFTRIAXONE 100 MILLIGRAM(S): 500 INJECTION, POWDER, FOR SOLUTION INTRAMUSCULAR; INTRAVENOUS at 12:20

## 2024-04-10 RX ADMIN — CEFTRIAXONE 100 MILLIGRAM(S): 500 INJECTION, POWDER, FOR SOLUTION INTRAMUSCULAR; INTRAVENOUS at 04:49

## 2024-04-10 RX ADMIN — Medication 40 MILLIEQUIVALENT(S): at 11:22

## 2024-04-10 RX ADMIN — MORPHINE SULFATE 4 MILLIGRAM(S): 50 CAPSULE, EXTENDED RELEASE ORAL at 14:05

## 2024-04-10 RX ADMIN — Medication 1000 MILLIGRAM(S): at 22:15

## 2024-04-10 RX ADMIN — MORPHINE SULFATE 4 MILLIGRAM(S): 50 CAPSULE, EXTENDED RELEASE ORAL at 13:25

## 2024-04-10 RX ADMIN — MORPHINE SULFATE 2 MILLIGRAM(S): 50 CAPSULE, EXTENDED RELEASE ORAL at 17:55

## 2024-04-10 RX ADMIN — Medication 1000 MILLIGRAM(S): at 13:29

## 2024-04-10 RX ADMIN — SODIUM CHLORIDE 1000 MILLILITER(S): 9 INJECTION INTRAMUSCULAR; INTRAVENOUS; SUBCUTANEOUS at 02:38

## 2024-04-10 RX ADMIN — Medication 1000 MILLIGRAM(S): at 21:31

## 2024-04-10 RX ADMIN — OXYCODONE HYDROCHLORIDE 5 MILLIGRAM(S): 5 TABLET ORAL at 12:26

## 2024-04-10 RX ADMIN — MORPHINE SULFATE 2 MILLIGRAM(S): 50 CAPSULE, EXTENDED RELEASE ORAL at 18:53

## 2024-04-10 NOTE — ED PROVIDER NOTE - CARE PLAN
1 Principal Discharge DX:	Hematuria  Secondary Diagnosis:	Dysuria  Secondary Diagnosis:	Abdominal pain

## 2024-04-10 NOTE — ED PROVIDER NOTE - OBJECTIVE STATEMENT
49-year-old female chief complaint of diffuse abdominal tenderness associated with hematuria for over 1 week.  Patient was seen in ED from April 5 to April 6 had CAT scan that reported: Subcentimeter right renal cortical hyperdensity possibly a small hemorrhagic cyst. Left renal cortical scarring. A few punctate   nonobstructing calcifications. Subcentimeter cortical hyperdensity possibly a small hemorrhagic cyst. No hydronephrosis or obstructing urinary tract calculi.    Patient return to ED for persistent symptoms.  No reported fever, no chest pain, no shortness of breath.  Patient with nausea.

## 2024-04-10 NOTE — H&P ADULT - PROBLEM SELECTOR PLAN 4
IMPROVE VTE Individual Risk Assessment   RISK                                                          Points  [  ] Previous VTE                                                3  [  ] Thrombophilia                                             2  [  ] Lower limb paralysis                                    2        (unable to hold up >15 seconds)    [  ] Current Cancer                                             2         (within 6 months)  [  x] Immobilization > 24 hrs                              1  [  ] ICU/CCU stay > 24 hours                            1  [  ] Age > 60                                                    1  IMPROVE VTE Score _________1  Low VTE risk, no chemical DVT prophylaxis needed at this time plan as above

## 2024-04-10 NOTE — H&P ADULT - NSHPREVIEWOFSYSTEMS_GEN_ALL_CORE
CONSTITUTIONAL: No fever, weight loss, or fatigue  EYES: No eye pain, visual disturbances, or discharge  ENT:  No difficulty hearing, tinnitus, vertigo; No sinus or throat pain  NECK: No pain or stiffness  RESPIRATORY: No cough, wheezing, chills or hemoptysis; No Shortness of Breath  CARDIOVASCULAR: No chest pain, palpitations, passing out, dizziness, or leg swelling  GASTROINTESTINAL: Positive abdominal No epigastric pain. No nausea, vomiting, or hematemesis; No diarrhea or constipation. No melena or hematochezia.  GENITOURINARY: Positive dysuria, frequency, hematuria, No incontinence  NEUROLOGICAL: No headaches, memory loss, loss of strength, numbness, or tremors  SKIN: No itching, burning, rashes, or lesions   LYMPH Nodes: No enlarged glands  ENDOCRINE: No heat or cold intolerance; No hair loss  MUSCULOSKELETAL: No joint pain or swelling; No muscle, back, No extremity pain  PSYCHIATRIC: No depression, anxiety, mood swings, or difficulty sleeping  HEME/LYMPH: No easy bruising, or bleeding gums  ALLERGY AND IMMUNOLOGIC: No hives or eczema

## 2024-04-10 NOTE — H&P ADULT - NSICDXFAMILYHX_GEN_ALL_CORE_FT
FAMILY HISTORY:  Father  Still living? Unknown  Family history of nephrolithiasis, Age at diagnosis: Age Unknown  FH: diabetes mellitus, Age at diagnosis: Age Unknown  FH: hypertension, Age at diagnosis: Age Unknown  FH: prostate cancer, Age at diagnosis: Age Unknown

## 2024-04-10 NOTE — H&P ADULT - ATTENDING COMMENTS
Patient seen/evaluated at bedside. I agree with the resident progress note/outlined plan of care. My independent findings and conclusions are documented.      Vital Signs Last 24 Hrs  T(C): 36.7 (10 Apr 2024 20:29), Max: 36.8 (10 Apr 2024 16:32)  T(F): 98.1 (10 Apr 2024 20:29), Max: 98.3 (10 Apr 2024 16:32)  HR: 66 (10 Apr 2024 20:29) (65 - 72)  BP: 123/83 (10 Apr 2024 20:29) (109/75 - 142/90)  RR: 18 (10 Apr 2024 20:29) (17 - 18)  SpO2: 96% (10 Apr 2024 20:29) (95% - 97%)                            11.2   4.64  )-----------( 319      ( 10 Apr 2024 02:00 )             35.3       04-10    142  |  108  |  19<H>  ----------------------------<  105<H>  3.4<L>   |  27  |  0.85    Ca    9.6      10 Apr 2024 02:00    TPro  7.1  /  Alb  3.9  /  TBili  0.2  /  DBili  x   /  AST  11  /  ALT  26  /  AlkPhos  67  04-10 Patient seen/evaluated at bedside on 4/10/2024 on 4S.  I agree with the resident H&P note/outlined plan of care. My independent findings and conclusions are documented.      Briefly this is a 49-year-old female w/ pmhx  of PE (2004 2/2 hysterectomy), history of hysterectomy lefth/o ovarian cysts nephrolithiasis presents with abdominal pain which starts in the left adexal region and radiates to the left flank. + association with hematuria x 1 week.  I      T(C): 36.7 (10 Apr 2024 20:29), Max: 36.8 (10 Apr 2024 16:32)  T(F): 98.1 (10 Apr 2024 20:29), Max: 98.3 (10 Apr 2024 16:32)  HR: 66 (10 Apr 2024 20:29) (65 - 72)  BP: 123/83 (10 Apr 2024 20:29) (109/75 - 142/90)  RR: 18 (10 Apr 2024 20:29) (17 - 18)  SpO2: 96% (10 Apr 2024 20:29) (95% - 97%)    physical exam  S1S2 RR  CTAB/l no wheezes/rhonchi/rales  mild left adnexal tenderness to left flank tenderness  no LE edema/cyanosis/clubbing  moves all extremities symmetrically                            11.2   4.64  )-----------( 319      ( 10 Apr 2024 02:00 )             35.3       04-10    142  |  108  |  19<H>  ----------------------------<  105<H>  3.4<L>   |  27  |  0.85    Ca    9.6      10 Apr 2024 02:00    TPro  7.1  /  Alb  3.9  /  TBili  0.2  /  DBili  x   /  AST  11  /  ALT  26  /  AlkPhos  67  04-10      #hemorrhagic renal cyst  #left adnexal to flank pain  #gross hematuria  #hypokalemia    -urology consult- gatito needs a CT abd/pelvis with contrast  -rule out ovarian cyst- has 1 ovary left  -continue with ceftriaxone until culture data is known  -replete potassium  -pain management consult  -oxycodone and morphine prn pain  -bowel regimen

## 2024-04-10 NOTE — ED PROVIDER NOTE - CLINICAL SUMMARY MEDICAL DECISION MAKING FREE TEXT BOX
Patient with third ER visit in past week  (Including [facility]) for abdominal pain, dysuria and hematuria.  Urine culture ordered, IV Rocephin ordered patient states not allergic..  Hospitalist Dr. Thayer and medical admitting resident endorsed patient admitted for IV antibiotics, pain management and possible urology consult  I had a detailed discussion with the patient and/or guardian regarding the historical points, exam findings, and any diagnostic results supporting the admit diagnosis. Patient with third ER visit in past week  (Including [facility]) for abdominal pain, dysuria and hematuria.  Urine culture ordered, IV Rocephin ordered patient states not allergic..  Hospitalist Dr. Thayer and medical admitting resident endorsed patient admitted for IV antibiotics, pain management and possible urology consult  I had a detailed discussion with the patient and/or guardian regarding the historical points, exam findings, and any diagnostic results supporting the admit diagnosis.    Note: T wave inversions anterior leads on EKG, trop neg. Pt has no chest pain. Patient with third ER visit in past week  (Including [facility]) for abdominal pain, dysuria and hematuria.  Urine culture ordered, IV Rocephin ordered patient states not allergic..  Hospitalist Dr. Thayer and medical admitting resident endorsed patient admitted for IV antibiotics, pain management and possible urology consult  I had a detailed discussion with the patient and/or guardian regarding the historical points, exam findings, and any diagnostic results supporting the admit diagnosis.

## 2024-04-10 NOTE — ED ADULT NURSE NOTE - NSFALLUNIVINTERV_ED_ALL_ED
Bed/Stretcher in lowest position, wheels locked, appropriate side rails in place/Call bell, personal items and telephone in reach/Instruct patient to call for assistance before getting out of bed/chair/stretcher/Non-slip footwear applied when patient is off stretcher/[place] to call system/Physically safe environment - no spills, clutter or unnecessary equipment/Purposeful proactive rounding/Room/bathroom lighting operational, light cord in reach

## 2024-04-10 NOTE — H&P ADULT - PROBLEM SELECTOR PLAN 5
- K is 3.4 on admission,   - likely secondary to poor PO intake  - replacing KCl 40 mg x1  - Continue to monitor electrolytes.  - f/u BMP

## 2024-04-10 NOTE — CONSULT NOTE ADULT - PROBLEM SELECTOR RECOMMENDATION 9
Pt with acute left flank pain radiating to left lower abdomen which is somatic and visceral in nature due to pyelonephritis. US pelvis demonstrates-Nonvisualization of the ovaries. No adnexal mass is seen.  Opioid pain recommendations   - Continue morphine 2mg/4mg IV q4h PRN moderate/ severe pain x 24hrs. Afterwards recommend to transition to PO regimen, oxycodone 5mg PO q4h PRN severe pain. Monitor for sedation/ respiratory depression.   Non-opioid pain recommendations   - Avoid NSAIDs  - Continue Acetaminophen 1 gram PO q 8 hours x 3 days.   Bowel Regimen  - Continue Miralax 17G PO daily  - Continue Senna 2 tablets at bedtime for constipation  - Continue dulcolax 5mg PO PRN constipation  Mild pain (score 1-3)  - Non-pharmacological pain treatment recommendations  - Warm/ Cool packs PRN   - Repositioning extremity, elevation, imagery, relaxation, distraction.  - Physical therapy OOB if no contraindications   Recommendations discussed with primary team and RN

## 2024-04-10 NOTE — ED ADULT TRIAGE NOTE - AS TEMP SITE
I explained the r/b/a of seeg with iris robot for right sided placement of depth electrodes, ground electrode, and left scalp electrodes.  the risk include but not limited to bleeding infection stroke paralysis death, need for further lead placement, surgery, and or adjuvant treatment.  parents understand and wish to proceed with surgery oral or delayed due to positive covid test

## 2024-04-10 NOTE — CONSULT NOTE ADULT - SUBJECTIVE AND OBJECTIVE BOX
Patient is a 49y old  Female who presents with a chief complaint of Pyelonephritis/hematuria (10 Apr 2024 15:15)    HPI:  49-year-old female from home ambulates independently at baseline with PMH of anxiety, PE (2004/ hysterectomy), nephrolithiasis presents to the ED complaining of diffuse abdominal tenderness associated with hematuria x 1 week.  Patient was seen on  for the same reason.  CT A/P at that time shows subcentimeter right renal cortical hyperdensity possibly a small hemorrhagic cyst, left renal cortical scarring.  A few punctate not obstructing calcifications.  Subcentimeter cortical hypodensity possibly a small hemorrhagic cyst.  No hydronephrosis or obstructing urinary tract calculi.  Patient was sent home.  She presents to the ED stating the symptoms has persisted.  She complains of dysuria, frequency associated with suprapubic tenderness and left flank tenderness.  Patient denies any fever, chills, nausea, vomiting, chest pain, shortness of breath, palpitations, weakness, numbness, tingling, headache, dizziness.  In the ED, patient is comfortable, NAD, VSS.  Patient was found to have hypokalemia K3.4 UA is positive x-ray of the chest is unremarkable.  Patient received CTX x 1, 1L bolus, Tylenol, morphine, Zofran in the ED.  Patient is admitted for pyelonephritis/hematuria. (10 Apr 2024 11:02)    Called see and eval 49y.o. Female w/PMH significant for L renal calculus for hematuria. Pt returns to [facility] c/o persistent hematuria. Pt was previously in [facility] 4/3/24 and 24 for hematuria. CTAP showed L cortical calcification without obs uropathy. Pt discharged home. Pt presented today c/o LLQ abd pain migrating to L flank pain, with light pink hematuria, which has now improved. Pt notes hx L calculi in the past, and had cysto, lithotripsy in .    PAST MEDICAL & SURGICAL HISTORY:  Anxiety      Pulmonary embolism      Nephrolithiasis      S/P hysterectomy      S/P appendectomy      S/P cholecystectomy    MEDICATIONS  (STANDING):  acetaminophen     Tablet .. 1000 milliGRAM(s) Oral every 8 hours  cefTRIAXone   IVPB 2000 milliGRAM(s) IV Intermittent every 24 hours  lidocaine   4% Patch 1 Patch Transdermal daily  naloxone Injectable 0.4 milliGRAM(s) IV Push once  polyethylene glycol 3350 17 Gram(s) Oral daily  senna 2 Tablet(s) Oral at bedtime    MEDICATIONS  (PRN):  bisacodyl 5 milliGRAM(s) Oral daily PRN Constipation  morphine  - Injectable 2 milliGRAM(s) IV Push every 4 hours PRN Moderate Pain (4 - 6)  morphine  - Injectable 4 milliGRAM(s) IV Push every 4 hours PRN Severe Pain (7 - 10)    Allergies    penicillin (Anaphylaxis)  sulfa drugs (Rash)  sulfa drugs (Anaphylaxis)  NSAIDs (Anaphylaxis)    Intolerances    Vital Signs Last 24 Hrs  T(C): 36.8 (10 Apr 2024 16:32), Max: 36.8 (10 Apr 2024 16:32)  T(F): 98.3 (10 Apr 2024 16:32), Max: 98.3 (10 Apr 2024 16:32)  HR: 65 (10 Apr 2024 16:32) (65 - 80)  BP: 124/85 (10 Apr 2024 16:32) (109/75 - 142/90)  BP(mean): --  RR: 18 (10 Apr 2024 16:32) (17 - 18)  SpO2: 96% (10 Apr 2024 16:32) (95% - 97%)    Parameters below as of 10 Apr 2024 16:32  Patient On (Oxygen Delivery Method): room air    Physical:  Gen: A&Ox3. NAD  Abd: Soft ND, NT  Back: No CVAT b/l    LABS:                        11.2   4.64  )-----------( 319      ( 10 Apr 2024 02:00 )             35.3              04-10    142  |  108  |  19<H>  ----------------------------<  105<H>  3.4<L>   |  27  |  0.85    Ca    9.6      10 Apr 2024 02:00    TPro  7.1  /  Alb  3.9  /  TBili  0.2  /  DBili  x   /  AST  11  /  ALT  26  /  AlkPhos  67  04-10              Urinalysis Basic - ( 10 Apr 2024 02:00 )    Color: Yellow / Appearance: Clear / S.013 / pH: x  Gluc: 105 mg/dL / Ketone: Negative mg/dL  / Bili: Negative / Urobili: 0.2 mg/dL   Blood: x / Protein: 30 mg/dL / Nitrite: Negative   Leuk Esterase: Small / RBC: 10 /HPF / WBC 6 /HPF   Sq Epi: x / Non Sq Epi: x / Bacteria: Occasional /HPF    RADIOLOGY & ADDITIONAL STUDIES:  < from: CT Abdomen and Pelvis No Cont (24 @ 03:55) >  IMPRESSION:    Subcentimeter right renal cortical hyperdensity possibly a small   hemorrhagic cyst. Left renal cortical scarring. A few punctate   nonobstructing calcifications. Subcentimeter cortical hyperdensity   possibly a small hemorrhagic cyst. No hydronephrosis or obstructing   urinary tract calculi.    < end of copied text >    < from: US Kidney and Bladder (04.10.24 @ 16:01) >  FINDINGS:  Right kidney: 11.3 cm. No renal mass, hydronephrosis or calculi.    Left kidney: 10.4 cm. Mildly limited evaluation. No definite renal mass   or hydronephrosis. Lobulated contour of the left renal upper pole with   calcification, 0.9 cm, as seen on recent CT.    Urinary bladder: Within normal limits. Bilateral ureteral jets   visualized. Prevoid volume: 474 cc. Postvoid volume: 16 cc.    < end of copied text >    < from: US Pelvis Complete (US Pelvis Complete .) (04.10.24 @ 16:01) >  TECHNIQUE:  Transabdominal pelvic sonogram only.    FINDINGS:  Uterus: Status post hysterectomy.    Right ovary: Not visualized  Left ovary: Not visualized.    < end of copied text >  
  Source of information: CAYLA THOMPSON, Chart review  Patient language: English  : n/a    HPI:  49-year-old female from home ambulates independently at baseline with PMH of anxiety, PE (2004/ hysterectomy), nephrolithiasis presents to the ED complaining of diffuse abdominal tenderness associated with hematuria x 1 week.  Patient was seen on  for the same reason.  CT A/P at that time shows subcentimeter right renal cortical hyperdensity possibly a small hemorrhagic cyst, left renal cortical scarring.  A few punctate not obstructing calcifications.  Subcentimeter cortical hypodensity possibly a small hemorrhagic cyst.  No hydronephrosis or obstructing urinary tract calculi.  Patient was sent home.  She presents to the ED stating the symptoms has persisted.  She complains of dysuria, frequency associated with suprapubic tenderness and left flank tenderness.  Patient denies any fever, chills, nausea, vomiting, chest pain, shortness of breath, palpitations, weakness, numbness, tingling, headache, dizziness.  In the ED, patient is comfortable, NAD, VSS.  Patient was found to have hypokalemia K3.4 UA is positive x-ray of the chest is unremarkable.  Patient received CTX x 1, 1L bolus, Tylenol, morphine, Zofran in the ED.  Patient is admitted for pyelonephritis/hematuria. (10 Apr 2024 11:02)    Pt is admitted for pyelonephritis. Pain consulted for flank pain. US pelvis demonstrates-Nonvisualization of the ovaries. No adnexal mass is seen.  Pt seen and examined at bedside. Reports left flank pain started , suddenly. Reports hx renal stones and colic, hx lithotripsy . Reports left flank pain score 8/10 SCALE USED: (1-10 VNRS). Pt describes pain as intermittent, colicky pain, radiating to left lower abdomen, alleviated by pain medication IV morphine, exacerbated by palpation. Pt tolerating PO diet. Denies lethargy, chest pain, SOB, nausea, vomiting, constipation. Pt reports dysuria with hematuria (improving). Reports last BM , diarrhea. Pt also reports chronic vaginal pain, dyspareunia. Patient stated goal for pain control: to be able to take deep breaths, get out of bed to chair and ambulate with tolerable pain control. Pt denies taking medications for pain at home.     PAST MEDICAL & SURGICAL HISTORY:  Anxiety      Pulmonary embolism      Nephrolithiasis      S/P hysterectomy      S/P appendectomy      S/P cholecystectomy          FAMILY HISTORY:  Family history of nephrolithiasis (Father)    FH: prostate cancer (Father)    FH: hypertension (Father)    FH: diabetes mellitus (Father)        Social History:  From home ambulates independently at baseline  Denies alcohol, tobacco, drug use (10 Apr 2024 11:02)    Allergies    penicillin (Anaphylaxis)  sulfa drugs (Rash)  sulfa drugs (Anaphylaxis)  NSAIDs (Anaphylaxis)    MEDICATIONS  (STANDING):  acetaminophen     Tablet .. 1000 milliGRAM(s) Oral every 8 hours  cefTRIAXone   IVPB 2000 milliGRAM(s) IV Intermittent every 24 hours  lidocaine   4% Patch 1 Patch Transdermal daily  naloxone Injectable 0.4 milliGRAM(s) IV Push once  polyethylene glycol 3350 17 Gram(s) Oral daily  senna 2 Tablet(s) Oral at bedtime    MEDICATIONS  (PRN):  bisacodyl 5 milliGRAM(s) Oral daily PRN Constipation  morphine  - Injectable 2 milliGRAM(s) IV Push every 4 hours PRN Moderate Pain (4 - 6)  morphine  - Injectable 4 milliGRAM(s) IV Push every 4 hours PRN Severe Pain (7 - 10)      Vital Signs Last 24 Hrs  T(C): 36.8 (10 Apr 2024 16:32), Max: 36.8 (10 Apr 2024 16:32)  T(F): 98.3 (10 Apr 2024 16:32), Max: 98.3 (10 Apr 2024 16:32)  HR: 65 (10 Apr 2024 16:32) (65 - 80)  BP: 124/85 (10 Apr 2024 16:32) (109/75 - 142/90)  BP(mean): --  RR: 18 (10 Apr 2024 16:32) (17 - 18)  SpO2: 96% (10 Apr 2024 16:32) (95% - 97%)    Parameters below as of 10 Apr 2024 16:32  Patient On (Oxygen Delivery Method): room air        LABS: Reviewed.                          11.2   4.64  )-----------( 319      ( 10 Apr 2024 02:00 )             35.3     04-10    142  |  108  |  19<H>  ----------------------------<  105<H>  3.4<L>   |  27  |  0.85    Ca    9.6      10 Apr 2024 02:00    TPro  7.1  /  Alb  3.9  /  TBili  0.2  /  DBili  x   /  AST  11  /  ALT  26  /  AlkPhos  67  04-10      LIVER FUNCTIONS - ( 10 Apr 2024 02:00 )  Alb: 3.9 g/dL / Pro: 7.1 g/dL / ALK PHOS: 67 U/L / ALT: 26 U/L DA / AST: 11 U/L / GGT: x           Urinalysis Basic - ( 10 Apr 2024 02:00 )    Color: Yellow / Appearance: Clear / S.013 / pH: x  Gluc: 105 mg/dL / Ketone: Negative mg/dL  / Bili: Negative / Urobili: 0.2 mg/dL   Blood: x / Protein: 30 mg/dL / Nitrite: Negative   Leuk Esterase: Small / RBC: 10 /HPF / WBC 6 /HPF   Sq Epi: x / Non Sq Epi: x / Bacteria: Occasional /HPF    Radiology: Reviewed.   < from: US Pelvis Complete (US Pelvis Complete .) (04.10.24 @ 16:01) >    ACC: [id number] EXAM:  US PELVIC COMPLETE   ORDERED BY: VIOLETA MELLO     PROCEDURE DATE:  04/10/2024          INTERPRETATION:  CLINICAL INFORMATION: Hematuria and abdominal pain.   Evaluate ovarian pathology.    LMP: Unknown    COMPARISON: CT abdomen pelvis 2024.    TECHNIQUE:  Transabdominal pelvic sonogram only.    FINDINGS:  Uterus: Status post hysterectomy.    Right ovary: Not visualized  Left ovary: Not visualized.    Fluid: None.    IMPRESSION:  Nonvisualization of the ovaries. No adnexalmass is seen.        --- End of Report ---            LEONARDO EDMOND MD; Attending Radiologist  This document has been electronically signed. Apr 10 2024  4:04PM    < end of copied text >      ORT Score -   Family Hx of substance abuse	Female	      Male  Alcohol 	                                           1                     3  Illegal drugs	                                   2                     3  Rx drugs                                           4 	                  4  Personal Hx of substance abuse		  Alcohol 	                                          3	                  3  Illegal drugs                                     4	                  4  Rx drugs                                            5 	                  5  Age between 16- 45 years	           1                     1  hx preadolescent sexual abuse	   3 	                  0  Psychological disease		  ADD, OCD, bipolar, schizophrenia   2	          2  Depression                                           1 	          1  Total: 0    a score of 3 or lower indicates low risk for opioid abuse		  a score of 4-7 indicates moderate risk for opioid abuse		  a score of 8 or higher indicates high risk for opioid abuse    REVIEW OF SYSTEMS:  CONSTITUTIONAL: No fever or fatigue  HEENT:  No difficulty hearing, no change in vision  NECK: No pain or stiffness  RESPIRATORY: No cough, wheezing, chills or hemoptysis; No shortness of breath  CARDIOVASCULAR: No chest pain, palpitations, dizziness, or leg swelling  GASTROINTESTINAL: No loss of appetite, decreased PO intake. + left lower abdominal pain. No nausea, vomiting; + hx diarrhea No constipation.   GENITOURINARY: dysuria with hematuria (improving). No frequency, retention or incontinence  GYN: + chronic vaginal pain, dyspareunia.   MUSCULOSKELETAL: No joint pain or swelling; No muscle, back, or extremity pain, no upper or lower motor strength weakness, no saddle anesthesia, bowel/bladder incontinence, no falls   NEURO: No headaches, No numbness/tingling b/l LE, No weakness    PHYSICAL EXAM:  GENERAL:  Alert & Oriented X4, cooperative, NAD, Good concentration. Speech is clear.   RESPIRATORY: Respirations even and unlabored. Clear to auscultation bilaterally; No rales, rhonchi, wheezing, or rubs  CARDIOVASCULAR: Normal S1/S2, regular rate and rhythm; No murmurs, rubs, or gallops. No JVD.   GASTROINTESTINAL:  Soft, + left lower quadrant tenderness, Nondistended; Bowel sounds present   GENITOURINARY: + left flank tenderness   PERIPHERAL VASCULAR:  Extremities warm without edema. 2+ Peripheral Pulses, No cyanosis, No calf tenderness  MUSCULOSKELETAL: Motor Strength 5/5 B/L upper and lower extremities; moves all extremities equally against gravity; ROM intact  SKIN: Warm, dry, intact. No rashes, lesions, scars or wounds.     Risk factors associated with adverse outcomes related to opioid treatment  [ ]  Concurrent benzodiazepine use  [ ]  History/ Active substance use or alcohol use disorder  [X ] Psychiatric co-morbidity  [ ] Sleep apnea  [ ] COPD  [ ] BMI> 35  [ ] Liver dysfunction  [ ] Renal dysfunction  [ ] CHF  [ ] Smoker  [ ]  Age > 60 years    [X ]  [place]  Reviewed and Copied to Chart. See below.    Plan of care and goal oriented pain management treatment options were discussed with patient and /or primary care giver; all questions and concerns were addressed and care was aligned with patient's wishes.    Educated patient on goal oriented pain management treatment options

## 2024-04-10 NOTE — H&P ADULT - PROBLEM SELECTOR PLAN 2
- K is 3.4 on admission,   - likely secondary to poor PO intake  - replacing KCl 40 mg x1  - Continue to monitor electrolytes.  - f/u BMP plan as above has a history of ovarian cyst complicated by pain  pt with h/o hysterectomy but left ovary remains  in place- rule out other proces  -check pelvic/transvaginal ultrasound

## 2024-04-10 NOTE — H&P ADULT - NSHPPHYSICALEXAM_GEN_ALL_CORE
GENERAL: NAD, well-groomed, well-developed  HEAD:  Atraumatic, Normocephalic  EYES: EOMI, PERRLA, conjunctiva and sclera clear  ENMT: No tonsillar erythema, exudates, or enlargement; Moist mucous membranes, Good dentition, No lesions  NECK: Supple, normal appearance, No JVD; Normal thyroid; Trachea midline  NERVOUS SYSTEM:  Alert & Oriented X3,  Motor Strength 5/5 B/L upper and lower extremities, sensation intact  CHEST/LUNG: Lungs clear to auscultation bilaterally, No rales, rhonchi, wheezing   HEART: Regular rate and rhythm; No murmurs, rubs, or gallops  ABDOMEN: Soft, nondistended; Bowel sounds present, Positive suprapubic tenderness, left flank pain tenderness  EXTREMITIES:  2+ Peripheral Pulses, No clubbing, cyanosis, or edema  LYMPH: No lymphadenopathy noted  SKIN: No rashes or lesions;  Good capillary refill

## 2024-04-10 NOTE — H&P ADULT - ASSESSMENT
49-year-old female from home ambulates independently at baseline with PMH of anxiety, PE (2004 2/2 hysterectomy), nephrolithiasis presents to the ED complaining of diffuse abdominal tenderness associated with hematuria x 1 week.  In the ED, patient is comfortable, NAD, VSS, with suprapubic tenderness and left flank tenderness.  Patient was found to have hypokalemia K3.4 UA is positive x-ray of the chest is unremarkable.  Patient received CTX x 1, 1L bolus, Tylenol, morphine, Zofran in the ED.  Patient is admitted for pyelonephritis/hematuria.

## 2024-04-10 NOTE — H&P ADULT - HISTORY OF PRESENT ILLNESS
49-year-old female from home ambulates independently at baseline with PMH of anxiety, PE (2004 2/2 hysterectomy), nephrolithiasis presents to the ED complaining of diffuse abdominal tenderness associated with hematuria x 1 week.  Patient was seen on April 6 for the same reason.  CT A/P at that time shows subcentimeter right renal cortical hyperdensity possibly a small hemorrhagic cyst, left renal cortical scarring.  A few punctate not obstructing calcifications.  Subcentimeter cortical hypodensity possibly a small hemorrhagic cyst.  No hydronephrosis or obstructing urinary tract calculi.  Patient was sent home.  She presents to the ED stating the symptoms has persisted.  She complains of dysuria, frequency associated with suprapubic tenderness and left flank tenderness.  Patient denies any fever, chills, nausea, vomiting, chest pain, shortness of breath, palpitations, weakness, numbness, tingling, headache, dizziness.  In the ED, patient is comfortable, NAD, VSS.  Patient was found to have hypokalemia K3.4 UA is positive x-ray of the chest is unremarkable.  Patient received CTX x 1, 1L bolus, Tylenol, morphine, Zofran in the ED.  Patient is admitted for pyelonephritis/hematuria. none minimal

## 2024-04-10 NOTE — CONSULT NOTE ADULT - ASSESSMENT
49y.o. Female with resolving hematuria, L flank pain     49y.o. Female with resolving hematuria, L flank pain    -Recommend CT urogram  -Monitor UO  -Avoid NSAIDs, AC  -Will follow

## 2024-04-10 NOTE — CONSULT NOTE ADULT - ASSESSMENT
Confidential Drug Utilization Report  Search Terms: [place] Howard, 1975Search Date: 04/10/2024 15:28:03 PM  The Drug Utilization Report below displays all of the controlled substance prescriptions, if any, that your patient has filled in the last twelve months. The information displayed on this report is compiled from pharmacy submissions to the Department, and accurately reflects the information as submitted by the pharmacies.    This report was requested by: Mali Azar | Reference #: [id number]    There are no results for the search terms that you entered.

## 2024-04-10 NOTE — H&P ADULT - PROBLEM SELECTOR PLAN 3
IMPROVE VTE Individual Risk Assessment   RISK                                                          Points  [  ] Previous VTE                                                3  [  ] Thrombophilia                                             2  [  ] Lower limb paralysis                                    2        (unable to hold up >15 seconds)    [  ] Current Cancer                                             2         (within 6 months)  [  x] Immobilization > 24 hrs                              1  [  ] ICU/CCU stay > 24 hours                            1  [  ] Age > 60                                                    1  IMPROVE VTE Score _________1  Low VTE risk, no chemical DVT prophylaxis needed at this time - K is 3.4 on admission,   - likely secondary to poor PO intake  - replacing KCl 40 mg x1  - Continue to monitor electrolytes.  - f/u BMP p/w dysuria, frequency, Hematuria x 1 week  Physical examination is positive for left flank pain, suprapubic tenderness  UA positive  f/u Urine culture  Start ABx - Rocephin 1g qd  Continue with pain management with Tylenol and oxycodone  f/u US of pelvis, transvaginal and kidney and bladder  f//u CT A/P IV contrast  consult urology

## 2024-04-10 NOTE — H&P ADULT - PROBLEM SELECTOR PLAN 1
p/w dysuria, frequency, Hematuria x 1 week  Physical examination is positive for left flank pain, suprapubic tenderness  UA positive  f/u Urine culture  Start ABx - Rocephin 2g qd  Continue with pain management with Tylenol and oxycodone p/w dysuria, frequency, Hematuria x 1 week  Physical examination is positive for left flank pain, suprapubic tenderness  UA positive  f/u Urine culture  Start ABx - Rocephin 2g qd  Continue with pain management with Tylenol and oxycodone  f/u US of pelvis, transvaginal and kidney and bladder  f//u CT A/P IV contrast  consult urology p/w dysuria, frequency, Hematuria x 1 week  Physical examination is positive for left flank pain, suprapubic tenderness  UA positive  f/u Urine culture  Start ABx - Rocephin 1g qd  Continue with pain management with Tylenol and oxycodone  f/u US of pelvis, transvaginal and kidney and bladder  f//u CT A/P IV contrast  consult urology one week of gross hematuria, UA with only 6WBC is not strongly supportive of UTI- based on personal history she could have other reasons besides an infection as cause for symptomology  likely needs a CT abd renal protocol which should be with IV contrast  -empiric treatment with ceftriaxone for now  urology consult  -follow up urine culture

## 2024-04-10 NOTE — H&P ADULT - PROBLEM SELECTOR PLAN 6
IMPROVE VTE Individual Risk Assessment   RISK                                                          Points  [  ] Previous VTE                                                3  [  ] Thrombophilia                                             2  [  ] Lower limb paralysis                                    2        (unable to hold up >15 seconds)    [  ] Current Cancer                                             2         (within 6 months)  [  x] Immobilization > 24 hrs                              1  [  ] ICU/CCU stay > 24 hours                            1  [  ] Age > 60                                                    1  IMPROVE VTE Score _________1  Low VTE risk, no chemical DVT prophylaxis needed at this time

## 2024-04-11 VITALS
HEART RATE: 67 BPM | TEMPERATURE: 97 F | SYSTOLIC BLOOD PRESSURE: 113 MMHG | RESPIRATION RATE: 20 BRPM | OXYGEN SATURATION: 98 % | DIASTOLIC BLOOD PRESSURE: 80 MMHG

## 2024-04-11 DIAGNOSIS — R10.2 PELVIC AND PERINEAL PAIN: ICD-10-CM

## 2024-04-11 DIAGNOSIS — N28.1 CYST OF KIDNEY, ACQUIRED: ICD-10-CM

## 2024-04-11 LAB
ALBUMIN SERPL ELPH-MCNC: 3.3 G/DL — LOW (ref 3.5–5)
ALP SERPL-CCNC: 63 U/L — SIGNIFICANT CHANGE UP (ref 40–120)
ALT FLD-CCNC: 17 U/L DA — SIGNIFICANT CHANGE UP (ref 10–60)
ANION GAP SERPL CALC-SCNC: 6 MMOL/L — SIGNIFICANT CHANGE UP (ref 5–17)
AST SERPL-CCNC: 12 U/L — SIGNIFICANT CHANGE UP (ref 10–40)
BILIRUB SERPL-MCNC: 0.2 MG/DL — SIGNIFICANT CHANGE UP (ref 0.2–1.2)
BUN SERPL-MCNC: 12 MG/DL — SIGNIFICANT CHANGE UP (ref 7–18)
CALCIUM SERPL-MCNC: 8.8 MG/DL — SIGNIFICANT CHANGE UP (ref 8.4–10.5)
CHLORIDE SERPL-SCNC: 109 MMOL/L — HIGH (ref 96–108)
CO2 SERPL-SCNC: 22 MMOL/L — SIGNIFICANT CHANGE UP (ref 22–31)
CREAT SERPL-MCNC: 0.69 MG/DL — SIGNIFICANT CHANGE UP (ref 0.5–1.3)
CULTURE RESULTS: SIGNIFICANT CHANGE UP
EGFR: 106 ML/MIN/1.73M2 — SIGNIFICANT CHANGE UP
GLUCOSE SERPL-MCNC: 94 MG/DL — SIGNIFICANT CHANGE UP (ref 70–99)
HCT VFR BLD CALC: 34.8 % — SIGNIFICANT CHANGE UP (ref 34.5–45)
HGB BLD-MCNC: 10.9 G/DL — LOW (ref 11.5–15.5)
MAGNESIUM SERPL-MCNC: 1.9 MG/DL — SIGNIFICANT CHANGE UP (ref 1.6–2.6)
MCHC RBC-ENTMCNC: 27.7 PG — SIGNIFICANT CHANGE UP (ref 27–34)
MCHC RBC-ENTMCNC: 31.3 GM/DL — LOW (ref 32–36)
MCV RBC AUTO: 88.5 FL — SIGNIFICANT CHANGE UP (ref 80–100)
NRBC # BLD: 0 /100 WBCS — SIGNIFICANT CHANGE UP (ref 0–0)
PHOSPHATE SERPL-MCNC: 4.3 MG/DL — SIGNIFICANT CHANGE UP (ref 2.5–4.5)
PLATELET # BLD AUTO: 267 K/UL — SIGNIFICANT CHANGE UP (ref 150–400)
POTASSIUM SERPL-MCNC: 4.2 MMOL/L — SIGNIFICANT CHANGE UP (ref 3.5–5.3)
POTASSIUM SERPL-SCNC: 4.2 MMOL/L — SIGNIFICANT CHANGE UP (ref 3.5–5.3)
PROT SERPL-MCNC: 6.1 G/DL — SIGNIFICANT CHANGE UP (ref 6–8.3)
RBC # BLD: 3.93 M/UL — SIGNIFICANT CHANGE UP (ref 3.8–5.2)
RBC # FLD: 14.6 % — HIGH (ref 10.3–14.5)
SODIUM SERPL-SCNC: 137 MMOL/L — SIGNIFICANT CHANGE UP (ref 135–145)
SPECIMEN SOURCE: SIGNIFICANT CHANGE UP
WBC # BLD: 2.99 K/UL — LOW (ref 3.8–10.5)
WBC # FLD AUTO: 2.99 K/UL — LOW (ref 3.8–10.5)

## 2024-04-11 PROCEDURE — 85027 COMPLETE CBC AUTOMATED: CPT

## 2024-04-11 PROCEDURE — 99232 SBSQ HOSP IP/OBS MODERATE 35: CPT

## 2024-04-11 PROCEDURE — 84100 ASSAY OF PHOSPHORUS: CPT

## 2024-04-11 PROCEDURE — 81001 URINALYSIS AUTO W/SCOPE: CPT

## 2024-04-11 PROCEDURE — 84484 ASSAY OF TROPONIN QUANT: CPT

## 2024-04-11 PROCEDURE — 36415 COLL VENOUS BLD VENIPUNCTURE: CPT

## 2024-04-11 PROCEDURE — 74178 CT ABD&PLV WO CNTR FLWD CNTR: CPT | Mod: 26

## 2024-04-11 PROCEDURE — 76856 US EXAM PELVIC COMPLETE: CPT

## 2024-04-11 PROCEDURE — 83690 ASSAY OF LIPASE: CPT

## 2024-04-11 PROCEDURE — 85025 COMPLETE CBC W/AUTO DIFF WBC: CPT

## 2024-04-11 PROCEDURE — 71045 X-RAY EXAM CHEST 1 VIEW: CPT

## 2024-04-11 PROCEDURE — 96376 TX/PRO/DX INJ SAME DRUG ADON: CPT

## 2024-04-11 PROCEDURE — 87086 URINE CULTURE/COLONY COUNT: CPT

## 2024-04-11 PROCEDURE — 83605 ASSAY OF LACTIC ACID: CPT

## 2024-04-11 PROCEDURE — 83735 ASSAY OF MAGNESIUM: CPT

## 2024-04-11 PROCEDURE — 76770 US EXAM ABDO BACK WALL COMP: CPT

## 2024-04-11 PROCEDURE — 99233 SBSQ HOSP IP/OBS HIGH 50: CPT

## 2024-04-11 PROCEDURE — 96374 THER/PROPH/DIAG INJ IV PUSH: CPT

## 2024-04-11 PROCEDURE — 74178 CT ABD&PLV WO CNTR FLWD CNTR: CPT | Mod: MC

## 2024-04-11 PROCEDURE — 96375 TX/PRO/DX INJ NEW DRUG ADDON: CPT

## 2024-04-11 PROCEDURE — 99285 EMERGENCY DEPT VISIT HI MDM: CPT

## 2024-04-11 PROCEDURE — 93005 ELECTROCARDIOGRAM TRACING: CPT

## 2024-04-11 PROCEDURE — 80053 COMPREHEN METABOLIC PANEL: CPT

## 2024-04-11 RX ORDER — OXYCODONE HYDROCHLORIDE 5 MG/1
5 TABLET ORAL EVERY 4 HOURS
Refills: 0 | Status: DISCONTINUED | OUTPATIENT
Start: 2024-04-11 | End: 2024-04-11

## 2024-04-11 RX ORDER — LIDOCAINE 4 G/100G
1 CREAM TOPICAL
Qty: 5 | Refills: 0
Start: 2024-04-11 | End: 2024-04-15

## 2024-04-11 RX ORDER — MORPHINE SULFATE 50 MG/1
2 CAPSULE, EXTENDED RELEASE ORAL EVERY 4 HOURS
Refills: 0 | Status: DISCONTINUED | OUTPATIENT
Start: 2024-04-11 | End: 2024-04-11

## 2024-04-11 RX ORDER — OXYCODONE HYDROCHLORIDE 5 MG/1
5 TABLET ORAL EVERY 6 HOURS
Refills: 0 | Status: DISCONTINUED | OUTPATIENT
Start: 2024-04-11 | End: 2024-04-11

## 2024-04-11 RX ORDER — MORPHINE SULFATE 50 MG/1
4 CAPSULE, EXTENDED RELEASE ORAL EVERY 4 HOURS
Refills: 0 | Status: DISCONTINUED | OUTPATIENT
Start: 2024-04-11 | End: 2024-04-11

## 2024-04-11 RX ORDER — OXYCODONE AND ACETAMINOPHEN 5; 325 MG/1; MG/1
2 TABLET ORAL
Qty: 40 | Refills: 0
Start: 2024-04-11 | End: 2024-04-15

## 2024-04-11 RX ADMIN — Medication 1000 MILLIGRAM(S): at 14:05

## 2024-04-11 RX ADMIN — MORPHINE SULFATE 4 MILLIGRAM(S): 50 CAPSULE, EXTENDED RELEASE ORAL at 12:03

## 2024-04-11 RX ADMIN — Medication 1000 MILLIGRAM(S): at 05:51

## 2024-04-11 RX ADMIN — MORPHINE SULFATE 4 MILLIGRAM(S): 50 CAPSULE, EXTENDED RELEASE ORAL at 07:20

## 2024-04-11 RX ADMIN — CEFTRIAXONE 100 MILLIGRAM(S): 500 INJECTION, POWDER, FOR SOLUTION INTRAMUSCULAR; INTRAVENOUS at 02:27

## 2024-04-11 RX ADMIN — Medication 1000 MILLIGRAM(S): at 06:34

## 2024-04-11 RX ADMIN — OXYCODONE HYDROCHLORIDE 5 MILLIGRAM(S): 5 TABLET ORAL at 16:36

## 2024-04-11 RX ADMIN — Medication 1000 MILLIGRAM(S): at 13:16

## 2024-04-11 RX ADMIN — MORPHINE SULFATE 4 MILLIGRAM(S): 50 CAPSULE, EXTENDED RELEASE ORAL at 06:45

## 2024-04-11 RX ADMIN — MORPHINE SULFATE 4 MILLIGRAM(S): 50 CAPSULE, EXTENDED RELEASE ORAL at 11:31

## 2024-04-11 RX ADMIN — MORPHINE SULFATE 4 MILLIGRAM(S): 50 CAPSULE, EXTENDED RELEASE ORAL at 03:00

## 2024-04-11 RX ADMIN — OXYCODONE HYDROCHLORIDE 5 MILLIGRAM(S): 5 TABLET ORAL at 17:21

## 2024-04-11 RX ADMIN — LIDOCAINE 1 PATCH: 4 CREAM TOPICAL at 11:08

## 2024-04-11 RX ADMIN — MORPHINE SULFATE 4 MILLIGRAM(S): 50 CAPSULE, EXTENDED RELEASE ORAL at 02:28

## 2024-04-11 NOTE — PROGRESS NOTE ADULT - ATTENDING COMMENTS
49y.o. Female with resolving hematuria, L flank pain. I have reviewed the images of the patient's CT urogram with left renal scaring and 3mm lower pole stone.     Plan  - no acute urologic intervention  - H&H stable  - will schedule patient for outpatient cysto  - Cr stable  - f/u urine culture

## 2024-04-11 NOTE — PROGRESS NOTE ADULT - PROBLEM SELECTOR PLAN 5
K 3.4 on admission,   likely secondary to poor PO intake  s/p KCl 40 mg x1  K 3.4 >4.2  RESOLVED  - Continue to monitor electrolytes.

## 2024-04-11 NOTE — DISCHARGE NOTE NURSING/CASE MANAGEMENT/SOCIAL WORK - NSDCPEFALRISK_GEN_ALL_CORE
For information on Fall & Injury Prevention, visit: [email or web address] OR  [email or web address] OR  https://www.cdc.gov/steadi/patient.html

## 2024-04-11 NOTE — PROGRESS NOTE ADULT - PROBLEM SELECTOR PLAN 4
IMPROVE VTE Individual Risk Assessment   RISK                                                          Points  [  ] Previous VTE                                                3  [  ] Thrombophilia                                             2  [  ] Lower limb paralysis                                    2        (unable to hold up >15 seconds)    [  ] Current Cancer                                             2         (within 6 months)  [  x] Immobilization > 24 hrs                              1  [  ] ICU/CCU stay > 24 hours                            1  [  ] Age > 60                                                    1  IMPROVE VTE Score _________1  Low VTE risk, no chemical DVT prophylaxis needed at this time Same as above

## 2024-04-11 NOTE — DISCHARGE NOTE PROVIDER - HOSPITAL COURSE
48 yo  female from home ambulates independently at baseline with PMH of anxiety, PE (2004 2/2 hysterectomy), nephrolithiasis presents to the ED complaining of diffuse abdominal tenderness associated with hematuria x 1 week.  In the ED, patient was hemodynamically stable, afebrile and saturating well on room air. Physical examination showed suprapubic tenderness and left flank tenderness.  Labs showed no leukocytosis. Urinalysis was positive. potassium 3.4.  x-ray of the chest is unremarkable.  Patient received CTX x 1, 1L bolus, Tylenol, KCL supplement,  morphine, Zofran in the ED.  Started on Ceftriaxone 1g QD. Patient is admitted for pyelonephritis/hematuria.    Since admission, patient has remained hemodynamically stable, afebrile and saturating well on room air. Ultrasound of Kidney & Bladder showed no hydronephrosis. Lobulated contour of the left renal upper pole with calcification, 0.9cm. Ultrasound Transvaginal & pelvis  showed s/p hysterectomy. Non visualization of the ovaries and no adnexal mass. Urine culture was negative for bacteria. CT scan of A/P with and without IV cont showed extensive left renal cortical scarring and mid renal calcification, either dystrophic cortical calcification or nonobstructive calculus in a calyceal diverticulum. 3 mm nonobstructive  left lower pole calculus. Dilated upper pole calyces with overlying cortical atrophy, either caliectasis or calyceal diverticula. Pain management was consulted to help manage the patient pain.     Urology team consulted and recommended xxxxxxxxxxxxxxxxxxxxx[id number]    Over the course of the hospital stay, patient potassium level normalized to 4.2 and patient did not have another episode of hematuria. Patient reports that her LT  back pain and abdominal pain improved with the pain medication given     Given patient's improved clinical status and current hemodynamic stability, decision was made to discharge after discussing with attending physician. Please refer to patient's complete medical chart with documents for a full hospital course, for this is only a brief summary.        .       50 yo  female from home ambulates independently at baseline with PMH of anxiety, PE (2004 2/2 hysterectomy), nephrolithiasis presents to the ED complaining of diffuse abdominal tenderness associated with hematuria x 1 week.  In the ED, patient was hemodynamically stable, afebrile and saturating well on room air. Physical examination showed suprapubic tenderness and left flank tenderness.  Labs showed no leukocytosis. Urinalysis was positive. potassium 3.4.  x-ray of the chest is unremarkable.  Patient received CTX x 1, 1L bolus, Tylenol, KCL supplement,  morphine, Zofran in the ED.  Started on Ceftriaxone 1g QD. Patient is admitted for pyelonephritis/hematuria.    Since admission, patient has remained hemodynamically stable, afebrile and saturating well on room air. Ultrasound of Kidney & Bladder showed no hydronephrosis. Lobulated contour of the left renal upper pole with calcification, 0.9cm. Ultrasound Transvaginal & pelvis  showed s/p hysterectomy. Non visualization of the ovaries and no adnexal mass. Urine culture was negative for bacteria. Patient previous CT scan A/P without cont (4/6/24) showed B/L Subcentimeter  renal cortical hyperdensity possibly a small hemorrhagic cyst.  A few punctate nonobstructive calcifications. No hydronephrosis or obstructing urinary tract calculi. Repeat CT scan of A/P with and without IV cont showed extensive left renal cortical scarring and mid renal calcification, either dystrophic cortical calcification or nonobstructive calculus in a calyceal diverticulum. 3 mm nonobstructive  left lower pole calculus. Dilated upper pole calyces with overlying cortical atrophy, either caliectasis or calyceal diverticula. Pain management was consulted to help manage the patient pain.     Urology team consulted and recommended no surgical intervention. Patient should follow-up outpatient with Urology Dr. Tierney.    Over the course of the hospital stay, patient potassium level normalized to 4.2 and patient did not have another episode of hematuria. Patient reports that her LT  back pain and abdominal pain improved with the pain medication given     Given patient's improved clinical status and current hemodynamic stability, decision was made to discharge after discussing with attending physician. Please refer to patient's complete medical chart with documents for a full hospital course, for this is only a brief summary.        .

## 2024-04-11 NOTE — PROGRESS NOTE ADULT - PROBLEM SELECTOR PLAN 1
p/w dysuria, frequency, Hematuria x 1 week  PE: + left flank pain, suprapubic tenderness  UA +  CT A&P:   >RT kidney: Subcentimeter  renal cortical hyperdensity possibly a small hemorrhagic cyst.   >LT kidney: cortical scarring. A few punctate nonobstructive calcifications. Subcentimeter cortical hyperdensity   possibly a small hemorrhagic cyst. No hydronephrosis or obstructing urinary tract calculi.  US Kidney& Bladder: No hydronephrosis. Lobulated contour of the left renal upper pole with calcification, 0.9cm  US Transvaginal & pelvis: s/p hysterectomy. Non visualization of the ovaries and no adnexal mass.  Started on Rocephin 1g qd (1/10)  Urology team consulted   > Rec CT urogram     - c/w pain management with Tylenol and oxycodone  - f/u Ucx  - c/w Rocephin 1g QD p/w dysuria, frequency, Hematuria x 1 week  PE: + left flank pain, suprapubic tenderness  UA +  CT A&P (4/6)  >RT kidney: Subcentimeter  renal cortical hyperdensity possibly a small hemorrhagic cyst.   >LT kidney: cortical scarring. A few punctate nonobstructive calcifications. Subcentimeter cortical hyperdensity   possibly a small hemorrhagic cyst. No hydronephrosis or obstructing urinary tract calculi.  US Kidney& Bladder: No hydronephrosis. Lobulated contour of the left renal upper pole with calcification, 0.9cm  US Transvaginal & pelvis: s/p hysterectomy. Non visualization of the ovaries and no adnexal mass.  Started on Rocephin 1g qd (1/10)  Urology team following  Pain management following  > Rec CT urogram (CT AP with and without IV cont)    - c/w pain management with Tylenol and  morphine  - f/u Ucx  - CT AP with and without IV cont  - c/w Rocephin 1g QD p/w dysuria, frequency, Hematuria x 1 week  PE: + left flank pain, suprapubic tenderness  UA +, Ucx Neg  Started on Rocephin 1g qd (1/10)  CT A/P (4/6): B/L: Subcentimeter  renal cortical hyperdensity possibly a small hemorrhagic cyst.  A few punctate nonobstructive calcifications. No hydronephrosis or obstructing urinary tract calculi.  US Kidney& Bladder: No hydronephrosis. Lobulated contour of the left renal upper pole with calcification, 0.9cm  US Transvaginal & pelvis: s/p hysterectomy. Non visualization of the ovaries and no adnexal mass.  CT A/P IV cont:  Extensive left renal cortical scarring and mid renal calcification, either dystrophic cortical calcification or nonobstructive calculus in a calyceal diverticulum. 3 mm nonobstructive  left lower pole calculus. Dilated upper pole calyces with overlying cortical atrophy, either caliectasis or calyceal diverticula.  Urology team following  Pain management following  > Rec CT urogram (CT AP with and without IV cont)    - c/w pain management with Tylenol and  morphine  - c/w Rocephin 1g QD p/w dysuria, frequency, Hematuria x 1 week  PE: + left flank pain, that radiated to LUQ and LLQ  UA was mildly positive with wbc 6, less likely an infection.   Started on Rocephin 1g qd (1/10)  CT A/P (4/6): B/L: Subcentimeter  renal cortical hyperdensity possibly a small hemorrhagic cyst.  A few punctate nonobstructive calcifications. No hydronephrosis or obstructing urinary tract calculi.  US Kidney& Bladder: No hydronephrosis. Lobulated contour of the left renal upper pole with calcification, 0.9cm  US Transvaginal & pelvis: s/p hysterectomy. Non visualization of the ovaries and no adnexal mass.  CT A/P IV cont:  Extensive left renal cortical scarring and mid renal calcification, either dystrophic cortical calcification or nonobstructive calculus in a calyceal diverticulum. 3 mm nonobstructive  left lower pole calculus. Dilated upper pole calyces with overlying cortical atrophy, either caliectasis or calyceal diverticula.  Ucx negative for bacteria   Pain management following  Urology team following  > no acute intervention, follow-up with Dr. Tierney outpatient  d/c'ed ceftriaxone due to less suspicion of an infection with neg urine culture and low wbc on UA  - c/w Tylenol and Oxycodone

## 2024-04-11 NOTE — DISCHARGE NOTE PROVIDER - CARE PROVIDER_API CALL
Otoniel Swanson  [facility]  [address] [facility], [address] [place] [address]  Phone: [phone]  Fax: [phone]  Follow Up Time: 1 week   Otoniel Swanson  [facility]  [address] [facility] [facility], [address]  [place] [address]  Phone: [phone]  Fax: [phone]  Follow Up Time: 1 week    Cedric Tierney  Urology  [address] [place] [id number]  Phone: [phone]  Fax: [phone]  Follow Up Time: 1 week

## 2024-04-11 NOTE — PROGRESS NOTE ADULT - PROBLEM SELECTOR PLAN 1
Pt with acute left flank pain radiating to left lower abdomen which is somatic and visceral in nature due to pyelonephritis. US pelvis demonstrates-Nonvisualization of the ovaries. No adnexal mass is seen. 4/11 CT urogram with left renal scaring and 3mm lower pole stone. Urology following, no acute urologic intervention. Follow up as outpatient.     Opioid pain recommendations   - Continue morphine 2mg/4mg IV q4h PRN moderate/ severe pain x 24 hours. Afterwards recommend to transition to PO regimen, Oxycodone 5mg PO q4h PRN severe pain. Monitor for sedation/ respiratory depression.   Non-opioid pain recommendations   - Avoid NSAIDs. Pt allergic.  - Continue Acetaminophen 1 gram PO q 8 hours x 3 days.   - Continue Lidocaine patches.  Bowel Regimen  - Continue Miralax 17G PO daily  - Continue Senna 2 tablets at bedtime for constipation  - Continue dulcolax 5mg PO PRN constipation  Mild pain (score 1-3)  - Non-pharmacological pain treatment recommendations  - Warm/ Cool packs PRN   - Repositioning extremity, elevation, imagery, relaxation, distraction.  - Physical therapy OOB if no contraindications   Recommendations discussed with primary team and RN.

## 2024-04-11 NOTE — PROGRESS NOTE ADULT - SUBJECTIVE AND OBJECTIVE BOX
Subjective  reports improvements in hematuria     Objective    Vital signs  T(F): , Max: 98.3 (04-10-24 @ 16:32)  HR: 67 (04-11-24 @ 13:41)  BP: 113/80 (04-11-24 @ 13:41)  SpO2: 98% (04-11-24 @ 13:41)  Wt(kg): --    Output       Gen: NAD  Abd: soft, nontender, nondistended  : voiding  Labs      04-11 @ 10:24    WBC 2.99  / Hct 34.8  / SCr --       04-11 @ 07:35    WBC --    / Hct --    / SCr 0.69         Culture - Urine (collected 04-10-24 @ 10:30)  Source: Clean Catch Clean Catch (Midstream)  Final Report (04-11-24 @ 14:17):    <10,000 CFU/mL Normal Urogenital Angeline    Urinalysis with Rflx Culture (collected 04-06-24 @ 02:23)        Urine Cx: ?  Blood Cx: ?  < from: CT Abdomen and Pelvis w/wo IV Cont (04.11.24 @ 12:51) >    IMPRESSION:  Extensive left renal cortical scarring.    Left mid renal calcification, either dystrophic cortical calcification or   nonobstructing calculus in a calyceal diverticulum. 3 mm nonobstructing   left lower pole calculus.    Dilated upper pole calyces with overlying cortical atrophy, either   caliectasis or calyceal diverticula.    < end of copied text >      Imaging
Source of information: CAYLA THOMPSON, Chart review  Patient language: English  : n/a    HPI:  49-year-old female from home ambulates independently at baseline with PMH of anxiety, PE (2004 2/2 hysterectomy), nephrolithiasis presents to the ED complaining of diffuse abdominal tenderness associated with hematuria x 1 week.  Patient was seen on April 6 for the same reason.  CT A/P at that time shows subcentimeter right renal cortical hyperdensity possibly a small hemorrhagic cyst, left renal cortical scarring.  A few punctate not obstructing calcifications.  Subcentimeter cortical hypodensity possibly a small hemorrhagic cyst.  No hydronephrosis or obstructing urinary tract calculi.  Patient was sent home.  She presents to the ED stating the symptoms has persisted.  She complains of dysuria, frequency associated with suprapubic tenderness and left flank tenderness.  Patient denies any fever, chills, nausea, vomiting, chest pain, shortness of breath, palpitations, weakness, numbness, tingling, headache, dizziness.  In the ED, patient is comfortable, NAD, VSS.  Patient was found to have hypokalemia K3.4 UA is positive x-ray of the chest is unremarkable.  Patient received CTX x 1, 1L bolus, Tylenol, morphine, Zofran in the ED.  Patient is admitted for pyelonephritis/hematuria. (10 Apr 2024 11:02)    Pt is admitted for pyelonephritis. Pain consulted for flank pain. US pelvis demonstrates-Nonvisualization of the ovaries. No adnexal mass is seen. Today, 4/11 CT urogram with left renal scaring and 3mm lower pole stone. Pt seen and examined at bedside this morning. Pt found lying in bed, awake, alert and oriented x 3, speech clear. Pt reports left flank pain started 4/9, suddenly. Reports hx renal stones and colic, hx lithotripsy 2009. Reports left flank pain score 7/10 currently, tolerable . SCALE USED: (1-10 VNRS). Pt describes pain as intermittent, colicky pain, radiating to left lower abdomen, alleviated by pain medication IV morphine, exacerbated by palpation. Attempted to transition to PO Oxycodone today since pt tolerating regular diet. Pt reports that Oxycodone doesn't work for her this acute pain. She wishes to continue with Morphine IV for her acute pain until discharge home, it works well for her pain. Pt tolerating PO diet. Denies lethargy, chest pain, SOB, nausea, vomiting, constipation. Pt reports dysuria with hematuria (improving). Reports last BM 4/10 yesterday. Patient stated goal for pain control: to be able to take deep breaths, get out of bed to chair and ambulate with tolerable pain control. Pt denies taking medications for pain at home. Pt ambulating to bathroom without any problems.     PAST MEDICAL & SURGICAL HISTORY:  Anxiety    Pulmonary embolism    Nephrolithiasis    S/P hysterectomy    S/P appendectomy    S/P cholecystectomy    FAMILY HISTORY:  Family history of nephrolithiasis (Father)    FH: prostate cancer (Father)    FH: hypertension (Father)    FH: diabetes mellitus (Father)    Social History:  From home ambulates independently at baseline  Denies alcohol, tobacco, drug use (10 Apr 2024 11:02)   [x]Denies ETOH use, illicit drug use, and smoking     Allergies    penicillin (Anaphylaxis)  sulfa drugs (Rash)  sulfa drugs (Anaphylaxis)  NSAIDs (Anaphylaxis)    Intolerances    MEDICATIONS  (STANDING):  acetaminophen     Tablet .. 1000 milliGRAM(s) Oral every 8 hours  cefTRIAXone   IVPB 1000 milliGRAM(s) IV Intermittent every 24 hours  lidocaine   4% Patch 1 Patch Transdermal daily  naloxone Injectable 0.4 milliGRAM(s) IV Push once  polyethylene glycol 3350 17 Gram(s) Oral daily  senna 2 Tablet(s) Oral at bedtime    MEDICATIONS  (PRN):  bisacodyl 5 milliGRAM(s) Oral daily PRN Constipation  oxyCODONE    IR 5 milliGRAM(s) Oral every 4 hours PRN Severe Pain (7 - 10)    Vital Signs Last 24 Hrs  T(C): 36.2 (11 Apr 2024 13:41), Max: 36.8 (10 Apr 2024 16:32)  T(F): 97.2 (11 Apr 2024 13:41), Max: 98.3 (10 Apr 2024 16:32)  HR: 67 (11 Apr 2024 13:41) (65 - 74)  BP: 113/80 (11 Apr 2024 13:41) (100/67 - 124/85)  BP(mean): --  RR: 20 (11 Apr 2024 13:41) (17 - 20)  SpO2: 98% (11 Apr 2024 13:41) (96% - 98%)    Parameters below as of 11 Apr 2024 13:41  Patient On (Oxygen Delivery Method): room air    LABS: Reviewed                        10.9   2.99  )-----------( 267      ( 11 Apr 2024 10:24 )             34.8     04-11    137  |  109<H>  |  12  ----------------------------<  94  4.2   |  22  |  0.69    Ca    8.8      11 Apr 2024 07:35  Phos  4.3     04-11  Mg     1.9     04-11    TPro  6.1  /  Alb  3.3<L>  /  TBili  0.2  /  DBili  x   /  AST  12  /  ALT  17  /  AlkPhos  63  04-11    LIVER FUNCTIONS - ( 11 Apr 2024 07:35 )  Alb: 3.3 g/dL / Pro: 6.1 g/dL / ALK PHOS: 63 U/L / ALT: 17 U/L DA / AST: 12 U/L / GGT: x           Urinalysis Basic - ( 11 Apr 2024 07:35 )    Color: x / Appearance: x / SG: x / pH: x  Gluc: 94 mg/dL / Ketone: x  / Bili: x / Urobili: x   Blood: x / Protein: x / Nitrite: x   Leuk Esterase: x / RBC: x / WBC x   Sq Epi: x / Non Sq Epi: x / Bacteria: x    CAPILLARY BLOOD GLUCOSE    Radiology: Reviewed  ACC: [id number] EXAM:  CT ABDOMEN AND PELVIS WAW IC   ORDERED BY: DARA PENA     PROCEDURE DATE:  04/11/2024      INTERPRETATION:  CLINICAL INFORMATION: 49 years  Female with CT urogram.    ADDITIONAL CLINICAL INFORMATION: Hematuria R31.9    COMPARISON: Noncontrast CT abdomen and pelvis 4/6/2024    CONTRAST/COMPLICATIONS:  IV Contrast: Omnipaque 350  90 cc administered   10 cc discarded  Oral Contrast: NONE  Complications: None reported at time of study completion    PROCEDURE:  CTof the Abdomen and Pelvis was performed.  Precontrast, Nephrographic and Excretory phases were acquired (CT   UROGRAM).  Sagittal and coronal reformats were performed.    FINDINGS:  LOWER CHEST: Mild bibasilar dependent atelectasis. Low attenuation   cardiac blood pool secondary to anemia.    LIVER: Within normal limits.  BILE DUCTS: Normal caliber.  GALLBLADDER: Cholecystectomy.  SPLEEN: Within normal limits.  PANCREAS: Within normal limits.  ADRENALS: Within normal limits.  KIDNEYS/URETERS: No right hydroureteronephrosis or calculus.   Subcentimeter right upper pole hyperdense cyst.  Extensive left renal cortical scarring. 8 x 2 mm left mid renal   calcification either dystrophic or calculus within a calyceal   diverticulum. Dilated left upper pole calyces with overlying cortical   scarring, either caliectasis or calyceal diverticula. 1 cm left mid renal   cyst. 3 mm nonobstructing left lower pole calculus.  Symmetrical nephrograms.    BLADDER: Within normal limits.  REPRODUCTIVE ORGANS: Hysterectomy.    BOWEL: No bowel obstruction. Appendix is surgically absent.  PERITONEUM: No ascites.  VESSELS: Within normal limits.  RETROPERITONEUM/LYMPH NODES: No lymphadenopathy.  ABDOMINAL WALL: Within normal limits.  BONES: Degenerative changes. Exaggerated lumbar lordosis.    IMPRESSION:  Extensive left renal cortical scarring.    Left mid renal calcification, either dystrophic cortical calcification or   nonobstructing calculus in a calyceal diverticulum. 3 mm non obstructing   left lower pole calculus.    Dilated upper pole calyces with overlying cortical atrophy, either   caliectasis or calyceal diverticula.    --- End of Report ---    LESLIE MASTERSON MD; Attending Radiologist  This document has been electronically signed.Apr 11 2024  1:16PM  ACC: [id number] EXAM:  US PELVIC COMPLETE   ORDERED BY: VIOLETA MELLO     PROCEDURE DATE:  04/10/2024      INTERPRETATION:  CLINICAL INFORMATION: Hematuria and abdominal pain.   Evaluate ovarian pathology.    LMP: Unknown    COMPARISON: CT abdomen pelvis 4/6/2024.    TECHNIQUE:  Transabdominal pelvic sonogram only.    FINDINGS:  Uterus: Status post hysterectomy.    Right ovary: Not visualized  Left ovary: Not visualized.    Fluid: None.    IMPRESSION:  Nonvisualization of the ovaries. No adnexalmass is seen.    --- End of Report ---    LEONARDO EDMOND MD; Attending Radiologist  This document has been electronically signed. Apr 10 2024  4:04PM    ORT Score -   Family Hx of substance abuse	Female	      Male  Alcohol 	                                           1                     3  Illegal drugs	                                   2                     3  Rx drugs                                           4 	                  4  Personal Hx of substance abuse		  Alcohol 	                                          3	                  3  Illegal drugs                                     4	                  4  Rx drugs                                            5 	                  5  Age between 16- 45 years	           1                     1  hx preadolescent sexual abuse	   3 	                  0  Psychological disease		  ADD, OCD, bipolar, schizophrenia   2	          2  Depression                                           1 	          1  Total: 0    a score of 3 or lower indicates low risk for opioid abuse		  a score of 4-7 indicates moderate risk for opioid abuse		  a score of 8 or higher indicates high risk for opioid abuse    REVIEW OF SYSTEMS:  CONSTITUTIONAL: No fever or fatigue  HEENT:  No difficulty hearing, no change in vision  NECK: No pain or stiffness  RESPIRATORY: No cough, wheezing, chills or hemoptysis; No shortness of breath  CARDIOVASCULAR: No chest pain, palpitations, dizziness, or leg swelling  GASTROINTESTINAL: No loss of appetite, decreased PO intake. + left lower abdominal pain. No nausea, vomiting; No constipation.   GENITOURINARY: dysuria with hematuria (improving). No frequency, retention or incontinence  MUSCULOSKELETAL: No joint pain or swelling; No muscle, back, or extremity pain, no upper or lower motor strength weakness, no saddle anesthesia, bowel/bladder incontinence, no falls   NEURO: No headaches, No numbness/tingling b/l LE, No weakness  PSYCH: Hx of anxiety, no depression, no insomnia.     PHYSICAL EXAM:  GENERAL:  Alert & Oriented X4, cooperative, NAD, Good concentration. Speech is clear.   RESPIRATORY: Respirations even and unlabored. Clear to auscultation bilaterally; No rales, rhonchi, wheezing, or rubs  CARDIOVASCULAR: Normal S1/S2, regular rate and rhythm; No murmurs, rubs, or gallops. No JVD.   GASTROINTESTINAL:  Soft, + left lower quadrant tenderness, Nondistended; Bowel sounds present   GENITOURINARY: + left flank tenderness   PERIPHERAL VASCULAR:  Extremities warm without edema. 2+ Peripheral Pulses, No cyanosis, No calf tenderness  MUSCULOSKELETAL: Motor Strength 5/5 B/L upper and lower extremities; moves all extremities equally against gravity; ROM intact  SKIN: Warm, dry, intact. No rashes, lesions, scars or wounds.     Risk factors associated with adverse outcomes related to opioid treatment  [ ]  Concurrent benzodiazepine use  [ ]  History/ Active substance use or alcohol use disorder  [x] Psychiatric co-morbidity  [ ] Sleep apnea  [ ] COPD  [ ] BMI> 35  [ ] Liver dysfunction  [ ] Renal dysfunction  [ ] CHF  [ ] Smoker  [ ]  Age > 60 years    [x]  [place]  Reviewed and Copied to Chart. See below.    Plan of care and goal oriented pain management treatment options were discussed with patient and /or primary care giver; all questions and concerns were addressed and care was aligned with patient's wishes.    Educated patient on goal oriented pain management treatment options     04-11-24 @ 16:21    
PGY-1 Progress Note discussed with attending    PAGER #: [[phone]] TILL 5:00 PM  PLEASE CONTACT ON CALL TEAM:  - On Call Team (Please refer to Marjorie) FROM 5:00 PM - 8:30PM  - Nightfloat Team FROM 8:30 -7:30 AM    INTERVAL HPI/OVERNIGHT EVENTS:   No overnight events. Patient was examined in the morning at bedside and she is in NAD. Patient states she still has the left lower back pain but the severity of the pain has improved.     REVIEW OF SYSTEMS:  CONSTITUTIONAL: No fever, chills,  or fatigue  RESPIRATORY: No cough, wheezing, No shortness of breath  CARDIOVASCULAR: No chest pain, palpitations,  leg swelling  GASTROINTESTINAL: + abdominal pain. No nausea, vomiting, or hematemesis; No diarrhea or constipation. No bloody or black stool  CVA: + LT CVA tenderness  GENITOURINARY: No dysuria or hematuria, urinary frequency  NEUROLOGICAL: No headaches, dizziness  SKIN: No itching, burning, rashes, or lesions     Vital Signs Last 24 Hrs  T(C): 36.3 (11 Apr 2024 05:26), Max: 36.8 (10 Apr 2024 16:32)  T(F): 97.4 (11 Apr 2024 05:26), Max: 98.3 (10 Apr 2024 16:32)  HR: 74 (11 Apr 2024 05:26) (65 - 74)  BP: 100/67 (11 Apr 2024 05:26) (100/67 - 142/90)  BP(mean): --  RR: 17 (11 Apr 2024 05:26) (17 - 18)  SpO2: 97% (11 Apr 2024 05:26) (96% - 97%)    Parameters below as of 11 Apr 2024 05:26  Patient On (Oxygen Delivery Method): room air        PHYSICAL EXAMINATION:  GENERAL: NAD,   HEAD:  Atraumatic, Normocephalic  EYES:  conjunctiva and sclera clear  NECK: Supple,    CHEST WALL: Nontender  LUNG: Clear to auscultation. No rales, rhonchi, wheezing, or rubs  HEART: Regular rate and rhythm; No murmurs,  ABDOMEN: + mild  LUQ and LLQ tenderness, Soft, Nondistended; Bowel sounds present, No Rovsing's sign   CVA: + LT CVA tenderness   NERVOUS SYSTEM:  Alert & Oriented X3,    EXTREMITIES:  2+ Peripheral Pulses, No clubbing, cyanosis, or edema  CALF: No Calf tenderness   SKIN: warm dry                          10.9   2.99  )-----------( 267      ( 11 Apr 2024 10:24 )             34.8     04-11    137  |  109<H>  |  12  ----------------------------<  94  4.2   |  22  |  0.69    Ca    8.8      11 Apr 2024 07:35  Phos  4.3     04-11  Mg     1.9     04-11    TPro  6.1  /  Alb  3.3<L>  /  TBili  0.2  /  DBili  x   /  AST  12  /  ALT  17  /  AlkPhos  63  04-11    LIVER FUNCTIONS - ( 11 Apr 2024 07:35 )  Alb: 3.3 g/dL / Pro: 6.1 g/dL / ALK PHOS: 63 U/L / ALT: 17 U/L DA / AST: 12 U/L / GGT: x                   CAPILLARY BLOOD GLUCOSE      RADIOLOGY & ADDITIONAL TESTS:

## 2024-04-11 NOTE — DISCHARGE NOTE PROVIDER - PROVIDER TOKENS
PROVIDER:[TOKEN:[[id number]],FOLLOWUP:[1 week]] PROVIDER:[TOKEN:[[id number]],FOLLOWUP:[1 week]],PROVIDER:[TOKEN:[[id number]],FOLLOWUP:[1 week]]

## 2024-04-11 NOTE — PROGRESS NOTE ADULT - PROBLEM SELECTOR PLAN 2
plan as above Same as above  No hematuria during the hospital course  Hgb remained stable at 10.9  RESOLVED

## 2024-04-11 NOTE — DISCHARGE NOTE PROVIDER - CARE PROVIDERS DIRECT ADDRESSES
[email or web address]

## 2024-04-11 NOTE — DISCHARGE NOTE NURSING/CASE MANAGEMENT/SOCIAL WORK - PATIENT PORTAL LINK FT
You can access the FollowMyHealth Patient Portal offered by [facility] by registering at the following website: [email or web address]. By joining [facility]’s FollowMyHealth portal, you will also be able to view your health information using other applications (apps) compatible with our system.

## 2024-04-11 NOTE — PROGRESS NOTE ADULT - ASSESSMENT
49y.o. Female with resolving hematuria, L flank pain    CT urogram with left renal scaring and 3mm lower pole stone.     Plan  - no acute urologic intervention  - H&H stable  - outpatient cysto  - Cr stable  - f/u urine culture   - okay to dc from urologic perspective   - follow-up with Dr. Tierney outpatient 
Confidential Drug Utilization Report  Search Terms: [place] Howard, 1975Search Date: 04/10/2024 15:28:03 PM  The Drug Utilization Report below displays all of the controlled substance prescriptions, if any, that your patient has filled in the last twelve months. The information displayed on this report is compiled from pharmacy submissions to the Department, and accurately reflects the information as submitted by the pharmacies.    This report was requested by: Mali Azar | Reference #: [id number]    There are no results for the search terms that you entered.  
49-year-old female from home ambulates independently at baseline with PMH of anxiety, PE (2004 2/2 hysterectomy), nephrolithiasis presents to the ED complaining of diffuse abdominal tenderness associated with hematuria x 1 week.  In the ED, patient is comfortable, NAD, VSS, with suprapubic tenderness and left flank tenderness.  Patient was found to have hypokalemia K3.4 UA is positive x-ray of the chest is unremarkable.  Patient received CTX x 1, 1L bolus, Tylenol, morphine, Zofran in the ED.  Patient is admitted for pyelonephritis/hematuria.

## 2024-04-11 NOTE — DISCHARGE NOTE PROVIDER - NSDCCPCAREPLAN_GEN_ALL_CORE_FT
PRINCIPAL DISCHARGE DIAGNOSIS  Diagnosis: Hematuria  Assessment and Plan of Treatment:       SECONDARY DISCHARGE DIAGNOSES  Diagnosis: Dysuria  Assessment and Plan of Treatment:     Diagnosis: Hypokalemia  Assessment and Plan of Treatment: On admission, your potassium level was 3.4. You were diagnosed with Hypokalemia which means low potassium. Your potassium was repleted while hospitalized. Upon discharge, your potassium is back to normal to 4.2. Please follow up with your Primary care physician in one week and repeat BMP.      Diagnosis: Abdominal pain  Assessment and Plan of Treatment:      PRINCIPAL DISCHARGE DIAGNOSIS  Diagnosis: Renal cyst, native, hemorrhage  Assessment and Plan of Treatment: You presented to the hospital  complaining of  abdominal tenderness associated with blood in the urine.  Your blood test showed no white blood cell count. which means there is low suspicion for infection. Your urinalysis had lots o blood and mild white blood cellss. You were started on Ceftriaoxone while in the hospital. Your urine culture was negative for bacteria, so the antibiotics was discontinued. On 4/6/24, you had A CT scan of the abdomen and pelvis without contrast which showed bilateral  Subcentimeter  renal cortical hyperdensity possibly a small hemorrhagic cyst.  A few punctate nonobstructive calcifications.No hydronephrosis or obstructing urinary tract calculi. On 4/11/24, a CT scan of the abdomen and pelvis with IV contrast showed extensive left renal cortical scarring and mid renal calcification, either dystrophic cortical calcification or nonobstructive calculus in a calyceal diverticulum. 3 mm nonobstructive  left lower pole calculus. Dilated upper pole calyces with overlying cortical atrophy, either caliectasis or calyceal diverticula. Urologist team saw you and recommended no surgical intervention and to follow up outpatient with Urologist Dr. Tierney.   Please follow up with your primary care physician/ Urologist Dr. Tierney in one week to inform them of your recent hospitalization and further management of your medical conditions.        SECONDARY DISCHARGE DIAGNOSES  Diagnosis: Gross hematuria  Assessment and Plan of Treatment: You presented to the hospital with  blood in your urine. Your urinalysis showed red blood cells.   During your hospital stay, your symptoms improved. Please follow up with your primary care physician/ Urologist Dr. Tierney  in one week to inform them of your recent hospitalization and further management of your medical conditions.       Diagnosis: Left flank pain  Assessment and Plan of Treatment: You presented to the hospital with left lower back pain radiating to your left abdomen.. Ultrasound of Kidney & Bladder showed no hydronephrosis (kidney swelling). Lobulated contour of the left renal upper pole with calcification, 0.9cm. Ultrasound Transvaginal & pelvis  showed post surgical  hysterectomy. Non visualization of the ovaries and no adnexal mass.   You were seen by the pain management team while you were in the hospital.   You will be discharge with Lidocaine patch and Oxycodone for the pain.  Please follow up with your primary care physician in one week to inform them of your recent hospitalization and further management of your medical conditions.      Diagnosis: Adnexal pain  Assessment and Plan of Treatment: You presented to the hospital with left lower back pain radiating to your left abdomen.. Ultrasound of Kidney & Bladder showed no hydronephrosis. Lobulated contour of the left renal upper pole with calcification, 0.9cm. Ultrasound Transvaginal & pelvis  showed s/p hysterectomy. Non visualization of the ovaries and no adnexal mass.   You were seen by the pain management team while you were in the hospital.   You will be discharge with Lidocaine patch and Oxycodone for the pain.  Please follow up with your OBGYN in one week to inform them of your recent hospitalization and further management of your medical conditions.    Diagnosis: Hypokalemia  Assessment and Plan of Treatment: On admission, your potassium level was 3.4. You were diagnosed with Hypokalemia which means low potassium. Your potassium was repleted while hospitalized. Upon discharge, your potassium is back to normal to 4.2. Please follow up with your Primary care physician in one week and repeat BMP.

## 2024-04-11 NOTE — PROGRESS NOTE ADULT - PROBLEM SELECTOR PLAN 3
K 3.4 on admission,   likely secondary to poor PO intake  s/p KCl 40 mg x1  K 3.4 >  - Continue to monitor electrolytes.  - f/u BMP K 3.4 on admission,   likely secondary to poor PO intake  s/p KCl 40 mg x1  K 3.4 >4.2  RESOLVED  - Continue to monitor electrolytes. PE: + left flank pain, that radiated to LUQ and LLQ  4/11 Patient reports pain improved with pain meds.   s/p morphine  Pain management consulted  >Oxycodone 5mg PO q4h PRN severe pain. Monitor for sedation/ respiratory depression.   > Avoid NSAIDs, Continue Acetaminophen 1 gram PO q 8 hours x 3 days.     ** Upon discharge, patient will be sent Oxycodone 5mg 2 tabs Q6hrs prn x 5 days***

## 2024-04-14 ENCOUNTER — EMERGENCY (EMERGENCY)
Facility: HOSPITAL | Age: 49
LOS: 1 days | Discharge: HOME | End: 2024-04-14
Attending: EMERGENCY MEDICINE | Admitting: EMERGENCY MEDICINE
Payer: MEDICAID

## 2024-04-14 VITALS
RESPIRATION RATE: 16 BRPM | DIASTOLIC BLOOD PRESSURE: 86 MMHG | SYSTOLIC BLOOD PRESSURE: 147 MMHG | TEMPERATURE: 98 F | HEART RATE: 92 BPM | HEIGHT: 67 IN | WEIGHT: 194.01 LBS | OXYGEN SATURATION: 97 %

## 2024-04-14 VITALS
RESPIRATION RATE: 18 BRPM | OXYGEN SATURATION: 97 % | DIASTOLIC BLOOD PRESSURE: 87 MMHG | SYSTOLIC BLOOD PRESSURE: 125 MMHG | HEART RATE: 75 BPM | TEMPERATURE: 98 F

## 2024-04-14 DIAGNOSIS — Z90.710 ACQUIRED ABSENCE OF BOTH CERVIX AND UTERUS: Chronic | ICD-10-CM

## 2024-04-14 DIAGNOSIS — Z90.49 ACQUIRED ABSENCE OF OTHER SPECIFIED PARTS OF DIGESTIVE TRACT: Chronic | ICD-10-CM

## 2024-04-14 PROCEDURE — 99284 EMERGENCY DEPT VISIT MOD MDM: CPT

## 2024-04-14 RX ORDER — OXYCODONE HYDROCHLORIDE 5 MG/1
10 TABLET ORAL ONCE
Refills: 0 | Status: DISCONTINUED | OUTPATIENT
Start: 2024-04-14 | End: 2024-04-14

## 2024-04-14 RX ADMIN — OXYCODONE HYDROCHLORIDE 10 MILLIGRAM(S): 5 TABLET ORAL at 22:08

## 2024-04-14 NOTE — ED PROVIDER NOTE - CLINICAL SUMMARY MEDICAL DECISION MAKING FREE TEXT BOX
49-year-old female history of anxiety, PE (2004 2/2 hysterectomy), nonobstructive nephrolithiasis  Patient comes to the emergency department reporting persistent left flank/lower quadrant discomfort.  Patient was discharged on 4/11 from [place] where she was being evaluated for hypokalemia and hematuria.  CT showed no signs of an obstructing kidney stone or hydronephrosis, urine culture was negative for bacteria, and hypokalemia had resolved.  Patient was discharged with lidocaine patch and prescription for Percocet.  However, patient is unable to fill her Percocet prescription due to lack of insurance and unable to afford it as is.  Denies any other significant symptoms of fever/chills, nausea/vomiting, chest pain, shortness of breath, bowel/bladder habit changes. Patient states that she took 975mg acetaminophen around 5pm tonight.     PE: Well-appearing, no acute distress, alert and oriented x 3, calm and cooperative.  Nonlabored respirations clear to auscultation bilaterally.  Abdomen soft nontender/nondistended; positive left CVA tenderness to palpation.    MDM: Patient presents requesting assistance with pain management as she was unable to obtain her Percocet prescription due to lack of insurance.  Exam unremarkable for any acute/emergent process, but showing persistent symptoms of her previously worked up pain.  Will give dose of analgesic medications here and patient states that she will reach out to her physician in the morning tomorrow.

## 2024-04-14 NOTE — ED ADULT NURSE NOTE - OBJECTIVE STATEMENT
Pt presents to ED A&Ox4 with c/o flank pain. Pt reports being admitted to hospital for pyelonephritis and being discharged on 4/11 after resolution of symptoms. Pt reports pain and dysuria has returned and is requesting medications for pain. Denies blood in urine. Denies changes in BM. Pt reports having prescription for percocet but is unable to fill prescription due to having no insurance. Denies PMH or prescribed medications.

## 2024-04-14 NOTE — ED PROVIDER NOTE - OBJECTIVE STATEMENT
49-year-old female history of anxiety, PE (2004 2/2 hysterectomy), nonobstructive nephrolithiasis  Patient comes to the emergency department reporting persistent left flank/lower quadrant discomfort.  Patient was discharged on 4/11 from [place] where she was being evaluated for hypokalemia and hematuria.  CT showed no signs of an obstructing kidney stone or hydronephrosis, urine culture was negative for bacteria, and hypokalemia had resolved.  Patient was discharged with lidocaine patch and prescription for Percocet.  However, patient is unable to fill her Percocet prescription due to lack of insurance and unable to afford it as is.  Denies any other significant symptoms of fever/chills, nausea/vomiting, chest pain, shortness of breath, bowel/bladder habit changes. Patient states that she took 975mg acetaminophen around 5pm tonight.

## 2024-04-14 NOTE — ED PROVIDER NOTE - NSFOLLOWUPINSTRUCTIONS_ED_ALL_ED_FT
Please contact your doctor in the morning to discuss options for pain management going forward.  Continue to take Tylenol as directed for assistance with pain control and use your lidocaine patches.  Seek immediate medical attention for any new/worsening signs or symptoms.

## 2024-04-14 NOTE — ED PROVIDER NOTE - PHYSICAL EXAMINATION
PE: Well-appearing, no acute distress, alert and oriented x 3, calm and cooperative.  Nonlabored respirations clear to auscultation bilaterally.  Abdomen soft nontender/nondistended; positive left CVA tenderness to palpation.

## 2024-04-14 NOTE — ED ADULT NURSE NOTE - CHIEF COMPLAINT QUOTE
female patient walk in to ED for eval of abdominal pain s/p discharge from [facility] on 04/11/24 w/ dx of pylonephritis and non obstructive kidney stone; patient reports she was admitted for 2 days and was given IV ceftriaxone; which is accurate w/ discharge paperwork provided. patient has script for percocet from [place] but states she can't fill it because she does not have insurance. patient states she is mainly here for pain management.

## 2024-04-14 NOTE — ED PROVIDER NOTE - PATIENT PORTAL LINK FT
You can access the FollowMyHealth Patient Portal offered by [facility] by registering at the following website: [email or web address]. By joining [employer]’s FollowMyHealth portal, you will also be able to view your health information using other applications (apps) compatible with our system.

## 2024-04-14 NOTE — ED ADULT TRIAGE NOTE - CHIEF COMPLAINT QUOTE
female patient walk in to ED for eval of abdominal pain s/p discharge from [facility] on 04/11/24 w/ dx of pylonephritis and non obstructive kidney stone; patient reports she was admitted for 2 days and was given IV ceftriaxone; which is accurate w/ discharge paperwork provided. patient has script for percocet from [place] but states she can't fill it because she does not have insurance. patient states she is mainly here for pain management.

## 2024-04-15 PROBLEM — N20.0 CALCULUS OF KIDNEY: Chronic | Status: ACTIVE | Noted: 2024-04-10

## 2024-04-15 PROBLEM — F41.9 ANXIETY DISORDER, UNSPECIFIED: Chronic | Status: ACTIVE | Noted: 2024-04-10

## 2024-04-15 PROBLEM — I26.99 OTHER PULMONARY EMBOLISM WITHOUT ACUTE COR PULMONALE: Chronic | Status: ACTIVE | Noted: 2024-04-10

## 2024-04-16 DIAGNOSIS — Z86.711 PERSONAL HISTORY OF PULMONARY EMBOLISM: ICD-10-CM

## 2024-04-16 DIAGNOSIS — Z87.442 PERSONAL HISTORY OF URINARY CALCULI: ICD-10-CM

## 2024-04-16 DIAGNOSIS — F41.9 ANXIETY DISORDER, UNSPECIFIED: ICD-10-CM

## 2024-04-16 DIAGNOSIS — Z88.2 ALLERGY STATUS TO SULFONAMIDES: ICD-10-CM

## 2024-04-16 DIAGNOSIS — Z88.6 ALLERGY STATUS TO ANALGESIC AGENT: ICD-10-CM

## 2024-04-16 DIAGNOSIS — Z88.0 ALLERGY STATUS TO PENICILLIN: ICD-10-CM

## 2024-04-16 DIAGNOSIS — Z90.710 ACQUIRED ABSENCE OF BOTH CERVIX AND UTERUS: ICD-10-CM

## 2024-04-16 DIAGNOSIS — R10.9 UNSPECIFIED ABDOMINAL PAIN: ICD-10-CM

## 2025-01-19 ENCOUNTER — EMERGENCY (EMERGENCY)
Facility: HOSPITAL | Age: 50
LOS: 1 days | Discharge: HOME | End: 2025-01-19
Attending: EMERGENCY MEDICINE | Admitting: EMERGENCY MEDICINE
Payer: MEDICAID

## 2025-01-19 VITALS
HEART RATE: 83 BPM | SYSTOLIC BLOOD PRESSURE: 124 MMHG | OXYGEN SATURATION: 98 % | DIASTOLIC BLOOD PRESSURE: 74 MMHG | RESPIRATION RATE: 16 BRPM

## 2025-01-19 VITALS
RESPIRATION RATE: 17 BRPM | HEART RATE: 98 BPM | OXYGEN SATURATION: 97 % | HEIGHT: 67 IN | TEMPERATURE: 97 F | WEIGHT: 194.01 LBS | DIASTOLIC BLOOD PRESSURE: 89 MMHG | SYSTOLIC BLOOD PRESSURE: 129 MMHG

## 2025-01-19 DIAGNOSIS — Z90.49 ACQUIRED ABSENCE OF OTHER SPECIFIED PARTS OF DIGESTIVE TRACT: Chronic | ICD-10-CM

## 2025-01-19 DIAGNOSIS — Z88.0 ALLERGY STATUS TO PENICILLIN: ICD-10-CM

## 2025-01-19 DIAGNOSIS — Z90.710 ACQUIRED ABSENCE OF BOTH CERVIX AND UTERUS: Chronic | ICD-10-CM

## 2025-01-19 DIAGNOSIS — R11.2 NAUSEA WITH VOMITING, UNSPECIFIED: ICD-10-CM

## 2025-01-19 DIAGNOSIS — R10.9 UNSPECIFIED ABDOMINAL PAIN: ICD-10-CM

## 2025-01-19 DIAGNOSIS — Z88.2 ALLERGY STATUS TO SULFONAMIDES: ICD-10-CM

## 2025-01-19 DIAGNOSIS — Z88.6 ALLERGY STATUS TO ANALGESIC AGENT: ICD-10-CM

## 2025-01-19 DIAGNOSIS — N20.0 CALCULUS OF KIDNEY: ICD-10-CM

## 2025-01-19 DIAGNOSIS — Z87.442 PERSONAL HISTORY OF URINARY CALCULI: ICD-10-CM

## 2025-01-19 LAB
ALBUMIN SERPL ELPH-MCNC: 3.8 G/DL — SIGNIFICANT CHANGE UP (ref 3.4–5)
ALP SERPL-CCNC: 71 U/L — SIGNIFICANT CHANGE UP (ref 40–120)
ALT FLD-CCNC: 23 U/L — SIGNIFICANT CHANGE UP (ref 12–42)
ANION GAP SERPL CALC-SCNC: 9 MMOL/L — SIGNIFICANT CHANGE UP (ref 9–16)
APPEARANCE UR: CLEAR — SIGNIFICANT CHANGE UP
AST SERPL-CCNC: 20 U/L — SIGNIFICANT CHANGE UP (ref 15–37)
BASOPHILS # BLD AUTO: 0.03 K/UL — SIGNIFICANT CHANGE UP (ref 0–0.2)
BASOPHILS NFR BLD AUTO: 0.6 % — SIGNIFICANT CHANGE UP (ref 0–2)
BILIRUB SERPL-MCNC: 0.2 MG/DL — SIGNIFICANT CHANGE UP (ref 0.2–1.2)
BILIRUB UR-MCNC: NEGATIVE — SIGNIFICANT CHANGE UP
BUN SERPL-MCNC: 13 MG/DL — SIGNIFICANT CHANGE UP (ref 7–23)
CALCIUM SERPL-MCNC: 9.3 MG/DL — SIGNIFICANT CHANGE UP (ref 8.5–10.5)
CHLORIDE SERPL-SCNC: 106 MMOL/L — SIGNIFICANT CHANGE UP (ref 96–108)
CO2 SERPL-SCNC: 27 MMOL/L — SIGNIFICANT CHANGE UP (ref 22–31)
COLOR SPEC: YELLOW — SIGNIFICANT CHANGE UP
CREAT SERPL-MCNC: 1.12 MG/DL — SIGNIFICANT CHANGE UP (ref 0.5–1.3)
DIFF PNL FLD: NEGATIVE — SIGNIFICANT CHANGE UP
EGFR: 60 ML/MIN/1.73M2 — SIGNIFICANT CHANGE UP
EOSINOPHIL # BLD AUTO: 0.06 K/UL — SIGNIFICANT CHANGE UP (ref 0–0.5)
EOSINOPHIL NFR BLD AUTO: 1.3 % — SIGNIFICANT CHANGE UP (ref 0–6)
GLUCOSE SERPL-MCNC: 103 MG/DL — HIGH (ref 70–99)
GLUCOSE UR QL: NEGATIVE MG/DL — SIGNIFICANT CHANGE UP
HCT VFR BLD CALC: 35.5 % — SIGNIFICANT CHANGE UP (ref 34.5–45)
HGB BLD-MCNC: 11.1 G/DL — LOW (ref 11.5–15.5)
IMM GRANULOCYTES NFR BLD AUTO: 0.2 % — SIGNIFICANT CHANGE UP (ref 0–0.9)
KETONES UR-MCNC: NEGATIVE MG/DL — SIGNIFICANT CHANGE UP
LEUKOCYTE ESTERASE UR-ACNC: ABNORMAL
LYMPHOCYTES # BLD AUTO: 0.86 K/UL — LOW (ref 1–3.3)
LYMPHOCYTES # BLD AUTO: 17.9 % — SIGNIFICANT CHANGE UP (ref 13–44)
MCHC RBC-ENTMCNC: 27.6 PG — SIGNIFICANT CHANGE UP (ref 27–34)
MCHC RBC-ENTMCNC: 31.3 G/DL — LOW (ref 32–36)
MCV RBC AUTO: 88.3 FL — SIGNIFICANT CHANGE UP (ref 80–100)
MONOCYTES # BLD AUTO: 0.39 K/UL — SIGNIFICANT CHANGE UP (ref 0–0.9)
MONOCYTES NFR BLD AUTO: 8.1 % — SIGNIFICANT CHANGE UP (ref 2–14)
NEUTROPHILS # BLD AUTO: 3.45 K/UL — SIGNIFICANT CHANGE UP (ref 1.8–7.4)
NEUTROPHILS NFR BLD AUTO: 71.9 % — SIGNIFICANT CHANGE UP (ref 43–77)
NITRITE UR-MCNC: NEGATIVE — SIGNIFICANT CHANGE UP
NRBC # BLD: 0 /100 WBCS — SIGNIFICANT CHANGE UP (ref 0–0)
PH UR: 7.5 — SIGNIFICANT CHANGE UP (ref 5–8)
PLATELET # BLD AUTO: 319 K/UL — SIGNIFICANT CHANGE UP (ref 150–400)
POTASSIUM SERPL-MCNC: 4.1 MMOL/L — SIGNIFICANT CHANGE UP (ref 3.5–5.3)
POTASSIUM SERPL-SCNC: 4.1 MMOL/L — SIGNIFICANT CHANGE UP (ref 3.5–5.3)
PROT SERPL-MCNC: 7.1 G/DL — SIGNIFICANT CHANGE UP (ref 6.4–8.2)
PROT UR-MCNC: NEGATIVE MG/DL — SIGNIFICANT CHANGE UP
RBC # BLD: 4.02 M/UL — SIGNIFICANT CHANGE UP (ref 3.8–5.2)
RBC # FLD: 14.4 % — SIGNIFICANT CHANGE UP (ref 10.3–14.5)
SODIUM SERPL-SCNC: 142 MMOL/L — SIGNIFICANT CHANGE UP (ref 132–145)
SP GR SPEC: 1.01 — SIGNIFICANT CHANGE UP (ref 1–1.03)
UROBILINOGEN FLD QL: 0.2 MG/DL — SIGNIFICANT CHANGE UP (ref 0.2–1)
WBC # BLD: 4.8 K/UL — SIGNIFICANT CHANGE UP (ref 3.8–10.5)
WBC # FLD AUTO: 4.8 K/UL — SIGNIFICANT CHANGE UP (ref 3.8–10.5)

## 2025-01-19 PROCEDURE — 74176 CT ABD & PELVIS W/O CONTRAST: CPT | Mod: 26

## 2025-01-19 PROCEDURE — 99284 EMERGENCY DEPT VISIT MOD MDM: CPT

## 2025-01-19 RX ORDER — SODIUM CHLORIDE 9 MG/ML
1000 INJECTION, SOLUTION INTRAMUSCULAR; INTRAVENOUS; SUBCUTANEOUS ONCE
Refills: 0 | Status: COMPLETED | OUTPATIENT
Start: 2025-01-19 | End: 2025-01-19

## 2025-01-19 RX ORDER — KETOROLAC TROMETHAMINE 30 MG/ML
15 INJECTION INTRAMUSCULAR; INTRAVENOUS ONCE
Refills: 0 | Status: DISCONTINUED | OUTPATIENT
Start: 2025-01-19 | End: 2025-01-19

## 2025-01-19 RX ORDER — LIDOCAINE HYDROCHLORIDE 10 MG/ML
130 INJECTION INFILTRATION; PERINEURAL ONCE
Refills: 0 | Status: COMPLETED | OUTPATIENT
Start: 2025-01-19 | End: 2025-01-19

## 2025-01-19 RX ORDER — ACETAMINOPHEN 80 MG/.8ML
1000 SOLUTION/ DROPS ORAL ONCE
Refills: 0 | Status: COMPLETED | OUTPATIENT
Start: 2025-01-19 | End: 2025-01-19

## 2025-01-19 RX ADMIN — ACETAMINOPHEN 400 MILLIGRAM(S): 80 SOLUTION/ DROPS ORAL at 16:16

## 2025-01-19 RX ADMIN — LIDOCAINE HYDROCHLORIDE 639 MILLIGRAM(S): 10 INJECTION INFILTRATION; PERINEURAL at 18:56

## 2025-01-19 RX ADMIN — SODIUM CHLORIDE 1000 MILLILITER(S): 9 INJECTION, SOLUTION INTRAMUSCULAR; INTRAVENOUS; SUBCUTANEOUS at 15:38

## 2025-01-19 NOTE — ED PROVIDER NOTE - PHYSICAL EXAMINATION
VITAL SIGNS: I have reviewed nursing notes and confirm.  CONSTITUTIONAL: Well-developed; well-nourished; in no acute distress.  SKIN: Skin is warm and dry, no acute rash.  HEAD: Normocephalic; atraumatic.  EYES: EOM intact; conjunctiva and sclera clear.  ENT: No nasal discharge; airway clear.  NECK: Supple; non tender.  CARD: S1, S2 normal; no murmurs, gallops, or rubs. Regular rate and rhythm.  RESP: No wheezes, rales or rhonchi, Normal respiratory effort  ABD: soft; non-distended; non-tender; no hepatosplenomegaly.  :  R flank pain  MSK: Normal ROM. No clubbing, cyanosis or edema.  NEURO: Alert, oriented. Grossly unremarkable.  PSYCH: Cooperative, appropriate.

## 2025-01-19 NOTE — ED ADULT NURSE NOTE - CHIEF COMPLAINT QUOTE
Patient walked in to [place] triage with c/o flank pain on the right side. States it is usually on the left. Has hx of kidney stones.  Took 3 x 375mg tylenol at 1130am

## 2025-01-19 NOTE — ED ADULT NURSE NOTE - NSFALLUNIVINTERV_ED_ALL_ED
Bed/Stretcher in lowest position, wheels locked, appropriate side rails in place/Call bell, personal items and telephone in reach/Instruct patient to call for assistance before getting out of bed/chair/stretcher/Non-slip footwear applied when patient is off stretcher/[place] to call system/Physically safe environment - no spills, clutter or unnecessary equipment/Purposeful proactive rounding/Room/bathroom lighting operational, light cord in reach

## 2025-01-19 NOTE — ED ADULT NURSE NOTE - OBJECTIVE STATEMENT
Pt aox4 with steady gait on arrival. iPt states right sided flank pain x days. Hematuria yesterday. hx kidney stones

## 2025-01-19 NOTE — ED PROVIDER NOTE - NSFOLLOWUPINSTRUCTIONS_ED_ALL_ED_FT
Kidney Stones  The urinary tract with a close-up of a kidney showing kidney stones.  Kidney stones are rock-like masses that form inside of the kidneys. Kidneys are organs that make pee (urine). A kidney stone may move into other parts of the urinary tract, including:  The tubes that connect the kidneys to the bladder (ureters).  The bladder.  The tube that carries urine out of the body (urethra).  Kidney stones can cause very bad pain and can block the flow of pee. The stone usually leaves your body through your pee. A doctor may need to take out the stone.    What are the causes?  Kidney stones may be caused by:  Too much calcium in the body. This may be caused by too much parathyroid hormone in the blood.  Uric acid crystals in the bladder. The body makes uric acid when you eat certain foods.  Narrowing of one or both of the ureters.  A kidney blockage that you were born with.  Past surgery on the kidney or the ureters.  What increases the risk?  You are more likely to develop this condition if:  You have had a kidney stone in the past.  Other people in your family have had kidney stones.  You do not drink enough water.  You eat a diet that is high in protein, salt (sodium), or sugar.  You are very overweight (obese).  What are the signs or symptoms?  Symptoms of a kidney stone may include:  Pain in the side of the belly, right below the ribs. Pain usually spreads to the groin.  Needing to pee often or right away.  Pain when peeing.  Blood in your pee.  Feeling like you may vomit (nauseous).  Vomiting.  Fever and chills.  How is this treated?  Treatment depends on the size, location, and makeup of the kidney stones. The stones will often pass out of the body when you pee. You may need to:  Drink more fluid to help pass the stone.  In some cases, you may be given fluids through an IV tube at the hospital.  Take medicine for pain.  Change your diet to help keep kidney stones from coming back.  Sometimes, you may need:  A procedure to break up kidney stones using a beam of light (laser) or shock waves.  Surgery to remove the kidney stones.  Follow these instructions at home:  Medicines    Take over-the-counter and prescription medicines only as told by your doctor.  Ask your doctor if the medicine prescribed to you requires you to avoid driving or using machinery.  Eating and drinking    Drink enough fluid to keep your pee pale yellow.  You may be told to drink at least 8–10 glasses of water each day. This will help you pass the stone.  If told by your doctor, change your diet. You may be told to:  Limit how much salt you eat.  Eat more fruits and vegetables.  Limit how much meat, poultry, fish, and eggs you eat.  Follow instructions from your doctor about what you may eat and drink.  General instructions    Collect pee samples as told by your doctor. You may need to collect a pee sample:  24 hours after a stone comes out.  8–12 weeks after a stone comes out, and every 6–12 months after that.  Strain your pee every time you pee. Use the strainer that your doctor recommends.  Do not throw out the stone. Keep it so that it can be tested by your doctor.  Keep all follow-up visits. You may need X-rays and ultrasounds to make sure the stone has come out.  How is this prevented?  To prevent another kidney stone:  Drink enough fluid to keep your pee pale yellow. This is the best way to prevent kidney stones.  Eat healthy foods.  Avoid certain foods as told by your doctor. You may be told to eat less protein.  Stay at a healthy weight.  Where to find more information  National Kidney Foundation (NKF): kidney.org  [employer] (UCF): urologyhealth.org  Contact a doctor if:  You have pain that gets worse or does not get better with medicine.  Get help right away if:  You have a fever or chills.  You get very bad pain.  You get new pain in your belly.  You faint.  You cannot pee.  This information is not intended to replace advice given to you by your health care provider. Make sure you discuss any questions you have with your health care provider.    Document Revised: 08/11/2023 Document Reviewed: 08/11/2023  Elsevier Patient Education © 2024 Elsevier Inc.  Elsevier logo  Terms and Conditions  Privacy Policy  Editorial Policy  All content on this site: Copyright © 2025 Elsevier, its licensors, and contributors. All rights are reserved, including those for text and data mining, AI training, and similar technologies. For all open access content, the Creative Commons licensing terms apply.  Cookies are used by this site. To decline or learn more, visit our Cookies page.

## 2025-01-19 NOTE — ED PROVIDER NOTE - CARE PLAN
1 Principal Discharge DX:	Right nephrolithiasis  Secondary Diagnosis:	Renal colic on right side   Principal Discharge DX:	Right flank pain  Secondary Diagnosis:	Bilateral nephrolithiasis

## 2025-01-19 NOTE — ED ADULT TRIAGE NOTE - CHIEF COMPLAINT QUOTE
Patient walked in to [place] triage with c/o flank pain on the right side. States it is usually on the left. Has hx of kidney stones.  Took 3 x 375mg tylenol at 1130am

## 2025-01-19 NOTE — ED PROVIDER NOTE - CARE PROVIDER_API CALL
Issa Hartman  Urology  [address]  [place] [address]  Phone: [phone]  Fax: [phone]  Follow Up Time: Urgent

## 2025-01-19 NOTE — ED PROVIDER NOTE - OBJECTIVE STATEMENT
51 y/o female with h/o renal stones with removal and lithotripsy in the past,  presents to ED with complaint of right flank pain since yesterday approximately 10 AM.  Patient reports that she was visiting family in [place] and returned today.  Patient reports she has been eating and drinking well.  Patient reports her pain was associated with nausea and 1 episode of vomiting.  She denies any diarrhea or fever.  She reports 1 episode of hematuria.  Patient denies any cough, congestion, other travel, or sick contacts.  Patient reports that her stones began in 2009,   But in the last 6 months her symptoms have become more common.  She reports that she usually passes them on her own.  Patient reports that she does not currently have a primary or urologist.  Patient denies any dysuria, chest pain, shortness of breath, abdominal pain or any other complaints at this time.  Patient reports that she has been taking Tylenol yesterday for pain with minimal relief.    PMD:    None  PSH: lithotripsy, removal of stone, appendectomy, hysterectomy, cholecystectomy, tubal ligation  PMH: renal stones bilaterally, anxiety, depression   Allergies: Please see chart  Soc:  - tob, + ETOH, - MJ  COVID-vaccine: 1 J&J  Influenza vaccine: Yes October of this season

## 2025-01-19 NOTE — ED PROVIDER NOTE - PROGRESS NOTE DETAILS
Labs and CT reviewed.  Labs demonstrate normal creatinine no blood in urine no evidence of infection.  CT demonstrates bilateral stable renal stones nonobstructing none entering the ureter causing any renal colic.  Results were discussed with patient.  Patient was given copy of results.  Patient is aware that when her insurance comes through as she is waiting for this to get follow-up appointment with primary and referral to urology as she will need continued management for known kidney stones.  Patient stable for discharge home.  Patient was given lidocaine IV before all results were in because complained of pain.  Patient was not giving any narcotics.  Patient does have an allergy to NSAIDs.

## 2025-01-19 NOTE — ED PROVIDER NOTE - NS ED ROS FT
Other than symptoms associated with present events the following is reported:  General:  No fever, no chills, no weight loss.  HEENT:  No sore throat.  Respiratory: No cough, no dyspnea, no wheeze.  Cardiovascular:  No chest pain, no palpitations, no orthopnea.  GI: No abdominal pain, + nausea/vomiting, no diarrhea.  : No dysuria, no frequency, no urgency, R flank pain, + hematuria  Musculoskeletal:  No joint pain, no myalgia.  Endocrine:  No generalized weakness, no polyuria.  Neurological:  No headache, no focal weakness.   Psychiatric: No emotional stress, no depression.  Derm:  No rash.  Heme:  No bruising, no bleeding.

## 2025-01-19 NOTE — ED PROVIDER NOTE - CLINICAL SUMMARY MEDICAL DECISION MAKING FREE TEXT BOX
CC:   right flank pain, history of renal stones since 2009  DDx:  renal colic, nephrolithiasis, pyelonephritis, infected stone, dehydration, electrolyte imbalance  Plan:  CBC, chemistry, 1 L bolus, UA/UCx, IV Tylenol as is allergic to NSAIDs, CT - re-eval

## 2025-01-19 NOTE — ED PROVIDER NOTE - PATIENT PORTAL LINK FT
You can access the FollowMyHealth Patient Portal offered by [facility] by registering at the following website: [email or web address]. By joining [employer]’s FollowMyHealth portal, you will also be able to view your health information using other applications (apps) compatible with our system.

## 2025-01-19 NOTE — ED PROVIDER NOTE - WET READ LAUNCH FT
No response.  Patient's Medicaid will cover cost. Anali Hess RN There are no Wet Read(s) to document.

## 2025-03-09 ENCOUNTER — EMERGENCY (EMERGENCY)
Facility: HOSPITAL | Age: 50
LOS: 1 days | Discharge: HOME | End: 2025-03-09
Attending: EMERGENCY MEDICINE | Admitting: EMERGENCY MEDICINE
Payer: MEDICAID

## 2025-03-09 VITALS
HEART RATE: 105 BPM | TEMPERATURE: 98 F | DIASTOLIC BLOOD PRESSURE: 98 MMHG | HEIGHT: 67 IN | SYSTOLIC BLOOD PRESSURE: 150 MMHG | WEIGHT: 194.01 LBS | RESPIRATION RATE: 16 BRPM | OXYGEN SATURATION: 97 %

## 2025-03-09 DIAGNOSIS — Z90.710 ACQUIRED ABSENCE OF BOTH CERVIX AND UTERUS: Chronic | ICD-10-CM

## 2025-03-09 DIAGNOSIS — Z90.49 ACQUIRED ABSENCE OF OTHER SPECIFIED PARTS OF DIGESTIVE TRACT: Chronic | ICD-10-CM

## 2025-03-09 DIAGNOSIS — Z87.19 PERSONAL HISTORY OF OTHER DISEASES OF THE DIGESTIVE SYSTEM: ICD-10-CM

## 2025-03-09 DIAGNOSIS — Z88.6 ALLERGY STATUS TO ANALGESIC AGENT: ICD-10-CM

## 2025-03-09 DIAGNOSIS — Z88.0 ALLERGY STATUS TO PENICILLIN: ICD-10-CM

## 2025-03-09 DIAGNOSIS — N12 TUBULO-INTERSTITIAL NEPHRITIS, NOT SPECIFIED AS ACUTE OR CHRONIC: ICD-10-CM

## 2025-03-09 DIAGNOSIS — R11.2 NAUSEA WITH VOMITING, UNSPECIFIED: ICD-10-CM

## 2025-03-09 DIAGNOSIS — Z88.2 ALLERGY STATUS TO SULFONAMIDES: ICD-10-CM

## 2025-03-09 LAB
ALBUMIN SERPL ELPH-MCNC: 4 G/DL — SIGNIFICANT CHANGE UP (ref 3.4–5)
ALP SERPL-CCNC: 76 U/L — SIGNIFICANT CHANGE UP (ref 40–120)
ALT FLD-CCNC: 30 U/L — SIGNIFICANT CHANGE UP (ref 12–42)
ANION GAP SERPL CALC-SCNC: 12 MMOL/L — SIGNIFICANT CHANGE UP (ref 9–16)
APPEARANCE UR: ABNORMAL
AST SERPL-CCNC: 20 U/L — SIGNIFICANT CHANGE UP (ref 15–37)
BILIRUB SERPL-MCNC: 0.2 MG/DL — SIGNIFICANT CHANGE UP (ref 0.2–1.2)
BILIRUB UR-MCNC: NEGATIVE — SIGNIFICANT CHANGE UP
BUN SERPL-MCNC: 10 MG/DL — SIGNIFICANT CHANGE UP (ref 7–23)
CALCIUM SERPL-MCNC: 9.7 MG/DL — SIGNIFICANT CHANGE UP (ref 8.5–10.5)
CHLORIDE SERPL-SCNC: 104 MMOL/L — SIGNIFICANT CHANGE UP (ref 96–108)
CO2 SERPL-SCNC: 27 MMOL/L — SIGNIFICANT CHANGE UP (ref 22–31)
COLOR SPEC: ABNORMAL
CREAT SERPL-MCNC: 0.84 MG/DL — SIGNIFICANT CHANGE UP (ref 0.5–1.3)
DIFF PNL FLD: ABNORMAL
EGFR: 84 ML/MIN/1.73M2 — SIGNIFICANT CHANGE UP
GLUCOSE SERPL-MCNC: 93 MG/DL — SIGNIFICANT CHANGE UP (ref 70–99)
GLUCOSE UR QL: NEGATIVE MG/DL — SIGNIFICANT CHANGE UP
HCG UR QL: NEGATIVE — SIGNIFICANT CHANGE UP
HCT VFR BLD CALC: 35.3 % — SIGNIFICANT CHANGE UP (ref 34.5–45)
HGB BLD-MCNC: 11.6 G/DL — SIGNIFICANT CHANGE UP (ref 11.5–15.5)
KETONES UR-MCNC: NEGATIVE MG/DL — SIGNIFICANT CHANGE UP
LEUKOCYTE ESTERASE UR-ACNC: ABNORMAL
MCHC RBC-ENTMCNC: 28.3 PG — SIGNIFICANT CHANGE UP (ref 27–34)
MCHC RBC-ENTMCNC: 32.9 G/DL — SIGNIFICANT CHANGE UP (ref 32–36)
MCV RBC AUTO: 86.1 FL — SIGNIFICANT CHANGE UP (ref 80–100)
NITRITE UR-MCNC: NEGATIVE — SIGNIFICANT CHANGE UP
NRBC # BLD AUTO: 0 K/UL — SIGNIFICANT CHANGE UP (ref 0–0)
NRBC # FLD: 0 K/UL — SIGNIFICANT CHANGE UP (ref 0–0)
NRBC BLD AUTO-RTO: 0 /100 WBCS — SIGNIFICANT CHANGE UP (ref 0–0)
PH UR: 6.5 — SIGNIFICANT CHANGE UP (ref 5–8)
PLATELET # BLD AUTO: 334 K/UL — SIGNIFICANT CHANGE UP (ref 150–400)
PMV BLD: 10 FL — SIGNIFICANT CHANGE UP (ref 7–13)
POTASSIUM SERPL-MCNC: 4.2 MMOL/L — SIGNIFICANT CHANGE UP (ref 3.5–5.3)
POTASSIUM SERPL-SCNC: 4.2 MMOL/L — SIGNIFICANT CHANGE UP (ref 3.5–5.3)
PROT SERPL-MCNC: 7.7 G/DL — SIGNIFICANT CHANGE UP (ref 6.4–8.2)
PROT UR-MCNC: 100 MG/DL
RBC # BLD: 4.1 M/UL — SIGNIFICANT CHANGE UP (ref 3.8–5.2)
RBC # FLD: 15.2 % — HIGH (ref 10.3–14.5)
SODIUM SERPL-SCNC: 143 MMOL/L — SIGNIFICANT CHANGE UP (ref 132–145)
SP GR SPEC: 1.01 — SIGNIFICANT CHANGE UP (ref 1–1.03)
UROBILINOGEN FLD QL: 0.2 MG/DL — SIGNIFICANT CHANGE UP (ref 0.2–1)
WBC # BLD: 4.49 K/UL — SIGNIFICANT CHANGE UP (ref 3.8–10.5)
WBC # FLD AUTO: 4.49 K/UL — SIGNIFICANT CHANGE UP (ref 3.8–10.5)

## 2025-03-09 PROCEDURE — 99285 EMERGENCY DEPT VISIT HI MDM: CPT | Mod: 25

## 2025-03-09 RX ORDER — KETOROLAC TROMETHAMINE 30 MG/ML
15 INJECTION, SOLUTION INTRAMUSCULAR; INTRAVENOUS ONCE
Refills: 0 | Status: DISCONTINUED | OUTPATIENT
Start: 2025-03-09 | End: 2025-03-09

## 2025-03-09 RX ORDER — ONDANSETRON HCL/PF 4 MG/2 ML
4 VIAL (ML) INJECTION ONCE
Refills: 0 | Status: COMPLETED | OUTPATIENT
Start: 2025-03-09 | End: 2025-03-09

## 2025-03-09 RX ADMIN — Medication 1000 MILLILITER(S): at 22:48

## 2025-03-09 RX ADMIN — Medication 4 MILLIGRAM(S): at 22:48

## 2025-03-09 NOTE — ED ADULT NURSE NOTE - IN THE PAST 12 MONTHS HAVE YOU USED DRUGS OTHER THAN THOSE REQUIRED FOR MEDICAL REASON?
pt c/o burning pain to eyes with sensitivity to light and watery s/p cutting pipe with construction equipment 2 days ago. Pt had black dust on face at that time and wiped off but may have gotten into eyes.
No

## 2025-03-09 NOTE — ED ADULT NURSE NOTE - OBJECTIVE STATEMENT
Pt in ED d/t right flank pain. Pt reports on and off emesis episodes today, nausea, hematuria. Hx of left kidney stone, feels different this time. AOx4, GCS 15.

## 2025-03-09 NOTE — ED ADULT TRIAGE NOTE - CHIEF COMPLAINT QUOTE
pt reports right flank pain, nausea, vomiting, hematuria, diarrhea started yesterday; hx kidney stone, diverticulitis

## 2025-03-10 VITALS
TEMPERATURE: 98 F | DIASTOLIC BLOOD PRESSURE: 71 MMHG | RESPIRATION RATE: 16 BRPM | OXYGEN SATURATION: 99 % | HEART RATE: 92 BPM | SYSTOLIC BLOOD PRESSURE: 121 MMHG

## 2025-03-10 LAB
CULTURE RESULTS: SIGNIFICANT CHANGE UP
SPECIMEN SOURCE: SIGNIFICANT CHANGE UP

## 2025-03-10 PROCEDURE — 74176 CT ABD & PELVIS W/O CONTRAST: CPT | Mod: 26

## 2025-03-10 RX ORDER — METOCLOPRAMIDE HCL 10 MG
10 TABLET ORAL ONCE
Refills: 0 | Status: COMPLETED | OUTPATIENT
Start: 2025-03-10 | End: 2025-03-10

## 2025-03-10 RX ORDER — OXYCODONE HYDROCHLORIDE AND ACETAMINOPHEN 10; 325 MG/1; MG/1
1 TABLET ORAL
Qty: 12 | Refills: 0
Start: 2025-03-10 | End: 2025-03-12

## 2025-03-10 RX ORDER — HYDROMORPHONE/SOD CHLOR,ISO/PF 2 MG/10 ML
0.5 SYRINGE (ML) INJECTION ONCE
Refills: 0 | Status: DISCONTINUED | OUTPATIENT
Start: 2025-03-10 | End: 2025-03-10

## 2025-03-10 RX ORDER — LEVOFLOXACIN 25 MG/ML
1 SOLUTION ORAL
Qty: 7 | Refills: 0
Start: 2025-03-10 | End: 2025-03-16

## 2025-03-10 RX ORDER — ACETAMINOPHEN 500 MG/5ML
2 LIQUID (ML) ORAL
Qty: 42 | Refills: 0
Start: 2025-03-10 | End: 2025-03-16

## 2025-03-10 RX ORDER — ONDANSETRON HCL/PF 4 MG/2 ML
4 VIAL (ML) INJECTION ONCE
Refills: 0 | Status: COMPLETED | OUTPATIENT
Start: 2025-03-10 | End: 2025-03-10

## 2025-03-10 RX ORDER — ONDANSETRON HCL/PF 4 MG/2 ML
4 VIAL (ML) INJECTION ONCE
Refills: 0 | Status: DISCONTINUED | OUTPATIENT
Start: 2025-03-10 | End: 2025-03-10

## 2025-03-10 RX ADMIN — Medication 4 MILLIGRAM(S): at 03:10

## 2025-03-10 RX ADMIN — Medication 4 MILLIGRAM(S): at 01:33

## 2025-03-10 RX ADMIN — Medication 4 MILLIGRAM(S): at 04:25

## 2025-03-10 RX ADMIN — Medication 1000 MILLILITER(S): at 01:33

## 2025-03-10 RX ADMIN — Medication 4 MILLIGRAM(S): at 04:16

## 2025-03-10 RX ADMIN — Medication 0.5 MILLIGRAM(S): at 05:28

## 2025-03-10 NOTE — ED PROVIDER NOTE - CARE PROVIDER_API CALL
Carleen Zhang  Family Medicine  [address] [place] [id number]  Phone: [phone]  Fax: [phone]  Follow Up Time:

## 2025-03-10 NOTE — ED PROVIDER NOTE - PROGRESS NOTE DETAILS
clinical picture consistent with pyelonephritis, no stone on CT. urine culture pending. pcn allergy (anaphylaxis), IV levaquin given. pain controlled, tolerating po. will rx levaquin, analgesia, f/u pcp, return precautions discussed. patient feeling better and would like to be discharged.

## 2025-03-10 NOTE — ED PROVIDER NOTE - ATTENDING APP SHARED VISIT CONTRIBUTION OF CARE
50-year-old female with pyelonephritis, IV antibiotics started in the ED, IV fluids, pain medications and antiemetics.  Patient significantly improved to the ED, offered admission for further doses of IV antibiotics but she stated she feels better, she tolerated p.o. in she would like to be discharged home.  She appears reliable, verbalized understanding of strict return precautions and agreed to return for persistent nausea or vomiting or inability to tolerate p.o. antibiotics.  Stable for DC home.

## 2025-03-10 NOTE — ED PROVIDER NOTE - NSFOLLOWUPINSTRUCTIONS_ED_ALL_ED_FT
Pyelonephritis    Pyelonephritis is a kidney infection. In most cases, the infection clears up with treatment and does not cause further problems. More severe infections or chronic infections can sometimes spread to the bloodstream or lead to other problems with the kidneys. Symptoms include frequent or painful urination, abdominal pain, back pain, flank pain, fever/chills, nausea, or vomiting. If you were prescribed an antibiotic medicine, take it as told by your health care provider. Do not stop taking the antibiotic even if you start to feel better.    SEEK IMMEDIATE MEDICAL CARE IF YOU HAVE ANY OF THE FOLLOWING SYMPTOMS: inability to hold down antibiotics or fluids, worsening pain, dizziness/lightheadedness, or change in mental status.     PLEASE FOLLOW-UP WITH YOUR PRIMARY CARE DOCTOR IN 1-2 DAYS FOR FURTHER EVALUATION.      PLEASE TAKE ALL PAPERWORK FROM TODAY'S VISIT TO YOUR PRIMARY DOCTOR.     IF YOU DO NOT HAVE A PRIMARY CARE DOCTOR PLEASE REFER TO THE OFFICE/CLINIC INFORMATION GIVEN BELOW:    If you do not have a doctor, you can call our referral line to find a doctor that matches your insurance; the number is [phone].     You can also follow up with clinics listed below, if you do not have a doctor:  [place] [address]  To make an appointment, call [phone]    [facility] [place]  Address: [address] [place] [address]  Appointment Center: [phone] ([phone])     To access your record on the patient portal [facility], please visit:  [email or web address]  If you are having difficulties setting this up, call [phone] and someone can assist you over the phone.     PLEASE RETURN TO THE ER IMMEDIATELY OR CALL 491 ANY HIGH FEVER, CHEST PAIN, TROUBLE BREATHING, VOMITING, SEVERE PAIN, OR ANY OTHER CONCERNS

## 2025-03-10 NOTE — ED PROVIDER NOTE - CLINICAL SUMMARY MEDICAL DECISION MAKING FREE TEXT BOX
50-year-old female with history of diverticulitis, kidney stones  with pyelonephritis and ureteral stents in 2009 but have been removed, presents complaining of right flank pain with associated burning with urination and blood in urine with nausea and vomiting over the past 2 days also had a few episodes of watery diarrhea.  Denies fever, chills.    looks well, no vitals derangements, differential includes pyelonephritis, renal stone. will obtain labs, CT abdomen r/o stone, UA, urine culture, IVF. analgesia ordered.

## 2025-03-10 NOTE — ED PROVIDER NOTE - PATIENT PORTAL LINK FT
You can access the FollowMyHealth Patient Portal offered by [facility] by registering at the following website: [email or web address]. By joining [employer]’s FollowMyHealth portal, you will also be able to view your health information using other applications (apps) compatible with our system. You can access the FollowMyHealth Patient Portal offered by [facility] by registering at the following website: [email or web address]. By joining [employer]’s FollowMyHealth portal, you will also be able to view your health information using other applications (apps) compatible with our system.

## 2025-03-10 NOTE — ED PROVIDER NOTE - OBJECTIVE STATEMENT
50-year-old female with history of diverticulitis, kidney stones  with pyelonephritis and ureteral stents in 2009 but have been removed, presents complaining of right flank pain with associated burning with urination and blood in urine with nausea and vomiting over the past 2 days also had a few episodes of watery diarrhea.  Denies fever, chills.

## 2025-03-10 NOTE — ED ADULT NURSE REASSESSMENT NOTE - NS ED NURSE REASSESS COMMENT FT1
Report called to [facility]. Awaiting bed.  Daughter Daniel Clock notified of transfer to  Patient reevaluated by MD; cleared for discharge. Patient alert verbal oriented x3; ambulatory w/ steady gait. Left ED safely without complaint. Discharge paperwork provided.

## 2025-03-10 NOTE — ED PROVIDER NOTE - PHYSICAL EXAMINATION
CONSTITUTIONAL: Well-appearing; well-nourished; in no apparent distress.   	HEAD: Normocephalic; atraumatic.   	EYES:  conjunctiva and sclera clear  	ENT: normal nose; no rhinorrhea; normal pharynx with no erythema or lesions.   	NECK: Supple; non-tender;   	CARDIOVASCULAR: Normal S1, S2; no murmurs, rubs, or gallops. Regular rate and rhythm.   	RESPIRATORY: Breathing easily; breath sounds clear and equal bilaterally; no wheezes, rhonchi, or rales.  	GI: Soft; non-distended; non-tender; R CVAT  	EXT: NGUYEN x 4. normal gait.   	SKIN: Normal for age and race; warm; dry; good turgor; no apparent lesions or rash.   	NEURO: A & O x 3; face symmetric; grossly unremarkable.   PSYCHOLOGICAL: The patient’s mood and manner are appropriate.

## 2025-03-11 LAB
CULTURE RESULTS: SIGNIFICANT CHANGE UP
SPECIMEN SOURCE: SIGNIFICANT CHANGE UP

## 2025-03-28 NOTE — PATIENT PROFILE ADULT - FUNCTIONAL SCREEN CURRENT LEVEL: SWALLOWING (IF SCORE 2 OR MORE FOR ANY ITEM, CONSULT REHAB SERVICES), MLM)
Major Events:  none    Subjective:  Feels better    Temp:  [36.7 °C (98 °F)-37.7 °C (99.8 °F)] 36.7 °C (98 °F)  Heart Rate:  [59-91] 80  Resp:  [18] 18  BP: (132-180)/(58-83) 180/77     SpO2 Readings from Last 3 Encounters:   03/28/25 96%   03/24/25 97%   03/21/25 100%     Anti-infectives (From admission, onward)      Start     Dose/Rate Route Frequency Ordered Stop    03/26/25 2100  cefUROXime (CEFTIN) tablet 250 mg         250 mg oral Every 12 hours 03/26/25 1223 03/30/25 2919          Physical Exam:  Skin: No rash  Sclera: Anicteric  Lungs: clr  CV: S1, S2 nl, no embolic changes  Abd: Soft, nt  Extr: No jt eff, no edema  Neuro: Alert, lucid    Lab Results   Component Value Date    WBC 25.43 (H) 03/27/2025    HGB 8.3 (L) 03/27/2025    HCT 25.3 (L) 03/27/2025    MCV 89.4 03/27/2025     03/27/2025     Lab Results   Component Value Date    GLUCOSE 112 (H) 03/28/2025    CALCIUM 8.2 (L) 03/28/2025     03/28/2025    K 4.3 03/28/2025    CO2 20 (L) 03/28/2025     (H) 03/28/2025    BUN 28 (H) 03/28/2025    CREATININE 1.8 (H) 03/28/2025     Lab Results   Component Value Date    ALBUMIN 2.5 (L) 03/26/2025    BILITOT 0.3 03/26/2025    ALKPHOS 73 03/26/2025    AST 14 03/26/2025    ALT 15 03/26/2025    PROTEIN 4.8 (L) 03/26/2025     Microbiology Results (last 3 days)       ** No results found for the last 72 hours. **            Imaging:     ULTRASOUND KIDNEYS / BLADDER  Result Date: 3/26/2025  IMPRESSION: Right-sided hydronephrosis and hydroureter suspected to the level of the urinary bladder. No left-sided hydronephrosis. Moderate to large post void residual.    Impression    Hi WBC  declining  No fever  urCx contam    Bladder/ur CA  Appears - may be causing obstruction    On cefurox 250 BID day 4/14    Needs f/u imaging and Cr re obstruction    Call c questions    PATIENCE Escalante MD     0 = swallows foods/liquids without difficulty

## 2025-04-06 ENCOUNTER — EMERGENCY (EMERGENCY)
Facility: HOSPITAL | Age: 50
LOS: 1 days | End: 2025-04-06
Attending: STUDENT IN AN ORGANIZED HEALTH CARE EDUCATION/TRAINING PROGRAM | Admitting: EMERGENCY MEDICINE

## 2025-04-06 VITALS
DIASTOLIC BLOOD PRESSURE: 67 MMHG | RESPIRATION RATE: 17 BRPM | HEART RATE: 79 BPM | OXYGEN SATURATION: 95 % | SYSTOLIC BLOOD PRESSURE: 104 MMHG | TEMPERATURE: 98 F

## 2025-04-06 VITALS
OXYGEN SATURATION: 95 % | RESPIRATION RATE: 18 BRPM | TEMPERATURE: 98 F | HEIGHT: 67 IN | HEART RATE: 103 BPM | DIASTOLIC BLOOD PRESSURE: 84 MMHG | SYSTOLIC BLOOD PRESSURE: 146 MMHG | WEIGHT: 194.01 LBS

## 2025-04-06 DIAGNOSIS — Z90.49 ACQUIRED ABSENCE OF OTHER SPECIFIED PARTS OF DIGESTIVE TRACT: Chronic | ICD-10-CM

## 2025-04-06 DIAGNOSIS — Z90.710 ACQUIRED ABSENCE OF BOTH CERVIX AND UTERUS: Chronic | ICD-10-CM

## 2025-04-06 LAB
ALBUMIN SERPL ELPH-MCNC: 3.8 G/DL — SIGNIFICANT CHANGE UP (ref 3.4–5)
ALP SERPL-CCNC: 67 U/L — SIGNIFICANT CHANGE UP (ref 40–120)
ALT FLD-CCNC: 25 U/L — SIGNIFICANT CHANGE UP (ref 12–42)
ANION GAP SERPL CALC-SCNC: 11 MMOL/L — SIGNIFICANT CHANGE UP (ref 9–16)
APPEARANCE UR: ABNORMAL
AST SERPL-CCNC: 14 U/L — LOW (ref 15–37)
BASOPHILS # BLD AUTO: 0.02 K/UL — SIGNIFICANT CHANGE UP (ref 0–0.2)
BASOPHILS NFR BLD AUTO: 0.4 % — SIGNIFICANT CHANGE UP (ref 0–2)
BILIRUB SERPL-MCNC: 0.3 MG/DL — SIGNIFICANT CHANGE UP (ref 0.2–1.2)
BILIRUB UR-MCNC: ABNORMAL
BUN SERPL-MCNC: 13 MG/DL — SIGNIFICANT CHANGE UP (ref 7–23)
CALCIUM SERPL-MCNC: 9.4 MG/DL — SIGNIFICANT CHANGE UP (ref 8.5–10.5)
CHLORIDE SERPL-SCNC: 107 MMOL/L — SIGNIFICANT CHANGE UP (ref 96–108)
CO2 SERPL-SCNC: 26 MMOL/L — SIGNIFICANT CHANGE UP (ref 22–31)
COLOR SPEC: SIGNIFICANT CHANGE UP
CREAT SERPL-MCNC: 0.98 MG/DL — SIGNIFICANT CHANGE UP (ref 0.5–1.3)
DIFF PNL FLD: ABNORMAL
EGFR: 70 ML/MIN/1.73M2 — SIGNIFICANT CHANGE UP
EGFR: 70 ML/MIN/1.73M2 — SIGNIFICANT CHANGE UP
EOSINOPHIL # BLD AUTO: 0.06 K/UL — SIGNIFICANT CHANGE UP (ref 0–0.5)
EOSINOPHIL NFR BLD AUTO: 1.2 % — SIGNIFICANT CHANGE UP (ref 0–6)
GLUCOSE SERPL-MCNC: 96 MG/DL — SIGNIFICANT CHANGE UP (ref 70–99)
GLUCOSE UR QL: NEGATIVE MG/DL — SIGNIFICANT CHANGE UP
HCG SERPL-ACNC: <1 MIU/ML — SIGNIFICANT CHANGE UP
HCT VFR BLD CALC: 33.4 % — LOW (ref 34.5–45)
HGB BLD-MCNC: 11 G/DL — LOW (ref 11.5–15.5)
IMM GRANULOCYTES # BLD AUTO: 0.01 K/UL — SIGNIFICANT CHANGE UP (ref 0–0.07)
IMM GRANULOCYTES NFR BLD AUTO: 0.2 % — SIGNIFICANT CHANGE UP (ref 0–0.9)
KETONES UR-MCNC: NEGATIVE MG/DL — SIGNIFICANT CHANGE UP
LEUKOCYTE ESTERASE UR-ACNC: ABNORMAL
LYMPHOCYTES # BLD AUTO: 0.7 K/UL — LOW (ref 1–3.3)
LYMPHOCYTES NFR BLD AUTO: 14.5 % — SIGNIFICANT CHANGE UP (ref 13–44)
MCHC RBC-ENTMCNC: 28.4 PG — SIGNIFICANT CHANGE UP (ref 27–34)
MCHC RBC-ENTMCNC: 32.9 G/DL — SIGNIFICANT CHANGE UP (ref 32–36)
MCV RBC AUTO: 86.3 FL — SIGNIFICANT CHANGE UP (ref 80–100)
MONOCYTES # BLD AUTO: 0.47 K/UL — SIGNIFICANT CHANGE UP (ref 0–0.9)
MONOCYTES NFR BLD AUTO: 9.8 % — SIGNIFICANT CHANGE UP (ref 2–14)
NEUTROPHILS # BLD AUTO: 3.56 K/UL — SIGNIFICANT CHANGE UP (ref 1.8–7.4)
NEUTROPHILS NFR BLD AUTO: 73.9 % — SIGNIFICANT CHANGE UP (ref 43–77)
NITRITE UR-MCNC: NEGATIVE — SIGNIFICANT CHANGE UP
NRBC # BLD AUTO: 0 K/UL — SIGNIFICANT CHANGE UP (ref 0–0)
NRBC # FLD: 0 K/UL — SIGNIFICANT CHANGE UP (ref 0–0)
NRBC BLD AUTO-RTO: 0 /100 WBCS — SIGNIFICANT CHANGE UP (ref 0–0)
PH UR: 6.5 — SIGNIFICANT CHANGE UP (ref 5–8)
PLATELET # BLD AUTO: 289 K/UL — SIGNIFICANT CHANGE UP (ref 150–400)
PMV BLD: 9.9 FL — SIGNIFICANT CHANGE UP (ref 7–13)
POTASSIUM SERPL-MCNC: 3.7 MMOL/L — SIGNIFICANT CHANGE UP (ref 3.5–5.3)
POTASSIUM SERPL-SCNC: 3.7 MMOL/L — SIGNIFICANT CHANGE UP (ref 3.5–5.3)
PROT SERPL-MCNC: 7.1 G/DL — SIGNIFICANT CHANGE UP (ref 6.4–8.2)
PROT UR-MCNC: 100 MG/DL
RBC # BLD: 3.87 M/UL — SIGNIFICANT CHANGE UP (ref 3.8–5.2)
RBC # FLD: 15.5 % — HIGH (ref 10.3–14.5)
SODIUM SERPL-SCNC: 144 MMOL/L — SIGNIFICANT CHANGE UP (ref 132–145)
SP GR SPEC: 1.02 — SIGNIFICANT CHANGE UP (ref 1–1.03)
UROBILINOGEN FLD QL: 0.2 MG/DL — SIGNIFICANT CHANGE UP (ref 0.2–1)
WBC # BLD: 4.82 K/UL — SIGNIFICANT CHANGE UP (ref 3.8–10.5)
WBC # FLD AUTO: 4.82 K/UL — SIGNIFICANT CHANGE UP (ref 3.8–10.5)

## 2025-04-06 PROCEDURE — 99285 EMERGENCY DEPT VISIT HI MDM: CPT

## 2025-04-06 PROCEDURE — 74176 CT ABD & PELVIS W/O CONTRAST: CPT | Mod: 26

## 2025-04-06 RX ORDER — CEFTRIAXONE 500 MG/1
1000 INJECTION, POWDER, FOR SOLUTION INTRAMUSCULAR; INTRAVENOUS ONCE
Refills: 0 | Status: DISCONTINUED | OUTPATIENT
Start: 2025-04-06 | End: 2025-04-06

## 2025-04-06 RX ORDER — CIPROFLOXACIN HCL 250 MG
500 TABLET ORAL ONCE
Refills: 0 | Status: COMPLETED | OUTPATIENT
Start: 2025-04-06 | End: 2025-04-06

## 2025-04-06 RX ORDER — ACETAMINOPHEN 500 MG/5ML
1000 LIQUID (ML) ORAL ONCE
Refills: 0 | Status: COMPLETED | OUTPATIENT
Start: 2025-04-06 | End: 2025-04-06

## 2025-04-06 RX ADMIN — Medication 500 MILLIGRAM(S): at 23:09

## 2025-04-06 RX ADMIN — Medication 4 MILLIGRAM(S): at 20:56

## 2025-04-06 RX ADMIN — Medication 400 MILLIGRAM(S): at 22:55

## 2025-04-06 RX ADMIN — Medication 1000 MILLILITER(S): at 20:57

## 2025-04-06 NOTE — ED PROVIDER NOTE - ATTENDING APP SHARED VISIT CONTRIBUTION OF CARE
Attending MD Chappell: I personally made/approved the management plan and take responsibility for the patient management.      see mdm       *The above represents an initial assessment/impression. Please refer to progress notes for potential changes in patient clinical course*

## 2025-04-06 NOTE — ED PROVIDER NOTE - CLINICAL SUMMARY MEDICAL DECISION MAKING FREE TEXT BOX
Sal Chappell MD (Attending MD): Patient is a 50-year-old female with history of kidney stones presenting to emergency department with right flank pain associated with nausea, hematuria.  X 2 days.  1 episode of emesis.  No associate chest pain, shortness breath, fevers.  Has urology appointment in [place] this month.    Review of systems otherwise negative.      General: Alert and Orientated x 3. No apparent distress.  Head: Normocephalic and atraumatic.  Eyes: PERRLA with EOMI.  Neck: Supple. Trachea midline.   Cardiac: Normal S1 and S2 w/ RRR. No murmurs appreciated. No JVD appreciated.  Pulmonary: CTA bilaterally. No increased WOB. No wheezes or crackles.  Abdominal: Soft, non-tender. (+) bowel sounds appreciated in all 4 quadrants. No hepatosplenomegaly. R CVA TTP,  Neurologic: No focal sensory or motor deficits.  Musculoskeletal: Strength appropriate in all 4 extremities for age with no limited ROM.  Skin: Color appropriate for race. Intact, warm, and well-perfused.  Psychiatric: Appropriate mood and affect. No apparent risk to self or others.     MDM: Tachycardic, afebrile.  Will get CT scan to evaluate for stone.  Urine to evaluate for UTI.  Pain control, fluids, reassess

## 2025-04-06 NOTE — ED ADULT NURSE NOTE - OBJECTIVE STATEMENT
Pt received aao3, room air, vs as charted, pt c/o right sided flank pain 8/10. Endorses intermittent hematuria, nausea, 1 episode of emesis this morning. Hx of kidney stones. Denies f/c. Safety maintained.

## 2025-04-06 NOTE — ED ADULT NURSE NOTE - NSFALLUNIVINTERV_ED_ALL_ED
Bed/Stretcher in lowest position, wheels locked, appropriate side rails in place/Call bell, personal items and telephone in reach/Instruct patient to call for assistance before getting out of bed/chair/stretcher/Non-slip footwear applied when patient is off stretcher/[place] to call system/Physically safe environment - no spills, clutter or unnecessary equipment/Purposeful proactive rounding/Room/bathroom lighting operational, light cord in reach

## 2025-04-06 NOTE — ED ADULT NURSE NOTE - CAS TRG GENERAL NORM CIRC DET
Bed: ED15  Expected date:   Expected time:   Means of arrival:   Comments:  Joshua Chang-   Capillary refill less/equal to 2 seconds

## 2025-04-06 NOTE — ED PROVIDER NOTE - PATIENT PORTAL LINK FT
You can access the FollowMyHealth Patient Portal offered by [facility] by registering at the following website: [email or web address]. By joining [facility]’s FollowMyHealth portal, you will also be able to view your health information using other applications (apps) compatible with our system.

## 2025-04-07 RX ORDER — CIPROFLOXACIN HCL 250 MG
1 TABLET ORAL
Qty: 14 | Refills: 0
Start: 2025-04-07 | End: 2025-04-13

## 2025-04-26 NOTE — ED ADULT NURSE NOTE - NSFALLOOBATTEMPT_ED_ALL_ED
Multiple Iv attempts by two Rns. Unsuccessful IV placement at this time. Provider made aware of need for ultrasound Iv placement.      Erin Murdock RN  04/26/25 0013    
Resident at bedside with ultrasound.     Deisy Gooden RN  04/26/25 0029    
No

## 2025-06-10 NOTE — ED ADULT TRIAGE NOTE - ADDITIONAL SAFETY/BANDS...
Ms. Isidro is a 37 year old female who presents as an established patient for a total skin evaluation.  Patient has no main concerns today.   Patient denies a personal history of melanoma, dysplastic nevi, non melanoma skin cancer   Patient does use sunscreen or sun protection.    Last skin exam: 11/03/2023    Denies any new, changing or symptomatic lesions.    PAST MEDICAL HISTORY:    Past Medical History:   Diagnosis Date    Excessive sweating      FAMILY HISTORY:  Mother had superficial spreading melanoma in 2017.  SOCIAL HISTORY:    Tobacco Use: Never             Alcohol Use: Yes           1.5 oz/week       Comment: Not during pregnancy     ROS:  The patient denies symptoms of fever, chills, night sweats, fatigue, weight loss, weight gain and decreased appetite.  Also see HPI for skin ROS.    ALLERGIES:  No Known Allergies    Current Outpatient Medications   Medication Sig    Isibloom 0.15-30 MG-MCG per tablet Take 1 tablet by mouth daily.     No current facility-administered medications for this visit.     PHYSICAL EXAM:   Healthy appearing White female in no acute distress.  Skin examination includes Full skin-  head/face/scalp/neck/chest/abdomen/back/buttocks and or genitals and or groin/right arm/left arm/ right leg/ left leg/ digits and or nails (11) 3 mm-7 mm, round to oval, brown, uniformly pigmented and discrete bordered macules and papules are located on the trunk. 1 cm x 3 cm linear brown papule right plantar heel. Patient has toenail polish on; denies any dark marks or streaks beneath.    There are no other concerning skin lesions.    ASSESSMENT/PLAN    1.  Clinically benign appearing melanocytic nevi-mainly junctional with some compound nevi on back       -Recommended periodic self skin exams and report any new or changing lesions.       -Recommend sunscreens SPF #15 or greater and protective clothing.       -ABCDs of melanoma were discussed. Visual education material provided.       -Patient will  report any new, changing or symptomatic lesions to clinic immediately.    2.  Clinically unchanging nevus on the right plantar foot.  No notable changes compared with  Photo taken 10/30/2018     3.  Clinically unchanging nevus on buttocks.  No notable changes compared with photo taken 8/20/21. Stable        -Recommend follow up in 12-24 months; sooner PRN.       I, Romi Beltrán MA, attest that I performed the duties of a scribe for services personally performed by Brittany Maldonado MD.    I, Brittany Maldonado M.D.,  attest that the documentation recorded by the scribe accurately and completely reflects the service(s) I personally performed and the decisions made by me.             Additional Safety/Bands: